# Patient Record
Sex: MALE | Employment: FULL TIME | URBAN - METROPOLITAN AREA
[De-identification: names, ages, dates, MRNs, and addresses within clinical notes are randomized per-mention and may not be internally consistent; named-entity substitution may affect disease eponyms.]

---

## 2021-11-03 ENCOUNTER — OFFICE VISIT (OUTPATIENT)
Dept: FAMILY MEDICINE CLINIC | Age: 41
End: 2021-11-03
Payer: COMMERCIAL

## 2021-11-03 VITALS
OXYGEN SATURATION: 98 % | DIASTOLIC BLOOD PRESSURE: 88 MMHG | WEIGHT: 18 LBS | HEIGHT: 66 IN | TEMPERATURE: 98.4 F | SYSTOLIC BLOOD PRESSURE: 129 MMHG | HEART RATE: 100 BPM | BODY MASS INDEX: 2.89 KG/M2 | RESPIRATION RATE: 20 BRPM

## 2021-11-03 DIAGNOSIS — Z11.59 ENCOUNTER FOR HEPATITIS C SCREENING TEST FOR LOW RISK PATIENT: ICD-10-CM

## 2021-11-03 DIAGNOSIS — E11.9 TYPE 2 DIABETES MELLITUS WITHOUT COMPLICATION, WITHOUT LONG-TERM CURRENT USE OF INSULIN (HCC): ICD-10-CM

## 2021-11-03 DIAGNOSIS — K21.9 GASTROESOPHAGEAL REFLUX DISEASE WITHOUT ESOPHAGITIS: ICD-10-CM

## 2021-11-03 DIAGNOSIS — I10 HYPERTENSION, UNSPECIFIED TYPE: ICD-10-CM

## 2021-11-03 DIAGNOSIS — Z09 HOSPITAL DISCHARGE FOLLOW-UP: ICD-10-CM

## 2021-11-03 DIAGNOSIS — Z12.5 SCREENING FOR MALIGNANT NEOPLASM OF PROSTATE: ICD-10-CM

## 2021-11-03 DIAGNOSIS — E78.2 MIXED HYPERLIPIDEMIA: ICD-10-CM

## 2021-11-03 DIAGNOSIS — K85.20 ALCOHOL-INDUCED ACUTE PANCREATITIS, UNSPECIFIED COMPLICATION STATUS: Primary | ICD-10-CM

## 2021-11-03 DIAGNOSIS — E78.1 HIGH BLOOD TRIGLYCERIDES: ICD-10-CM

## 2021-11-03 PROCEDURE — 99204 OFFICE O/P NEW MOD 45 MIN: CPT | Performed by: FAMILY MEDICINE

## 2021-11-03 RX ORDER — ATORVASTATIN CALCIUM 10 MG/1
10 TABLET, FILM COATED ORAL DAILY
COMMUNITY
Start: 2021-10-14 | End: 2021-11-03 | Stop reason: SDUPTHER

## 2021-11-03 RX ORDER — METOPROLOL TARTRATE 50 MG/1
50 TABLET ORAL EVERY 12 HOURS
Qty: 180 TABLET | Refills: 3 | Status: SHIPPED | OUTPATIENT
Start: 2021-11-03

## 2021-11-03 RX ORDER — LISINOPRIL 20 MG/1
20 TABLET ORAL DAILY
Qty: 90 TABLET | Refills: 3 | Status: SHIPPED | OUTPATIENT
Start: 2021-11-03

## 2021-11-03 RX ORDER — METFORMIN HYDROCHLORIDE 1000 MG/1
1000 TABLET ORAL 2 TIMES DAILY
COMMUNITY
Start: 2021-10-14 | End: 2021-11-03 | Stop reason: SDUPTHER

## 2021-11-03 RX ORDER — GLIMEPIRIDE 4 MG/1
4 TABLET ORAL DAILY
COMMUNITY
Start: 2021-10-12 | End: 2021-11-03 | Stop reason: SDUPTHER

## 2021-11-03 RX ORDER — GLIMEPIRIDE 4 MG/1
4 TABLET ORAL DAILY
Qty: 90 TABLET | Refills: 3 | Status: SHIPPED | OUTPATIENT
Start: 2021-11-03

## 2021-11-03 RX ORDER — ATORVASTATIN CALCIUM 10 MG/1
10 TABLET, FILM COATED ORAL DAILY
Qty: 90 TABLET | Refills: 3 | Status: SHIPPED | OUTPATIENT
Start: 2021-11-03 | End: 2022-09-15 | Stop reason: SDUPTHER

## 2021-11-03 RX ORDER — PANTOPRAZOLE SODIUM 40 MG/1
40 TABLET, DELAYED RELEASE ORAL DAILY
Qty: 90 TABLET | Refills: 3 | Status: SHIPPED | OUTPATIENT
Start: 2021-11-03

## 2021-11-03 RX ORDER — FENOFIBRATE 48 MG/1
TABLET, COATED ORAL
COMMUNITY
Start: 2021-10-27 | End: 2021-11-03 | Stop reason: SDUPTHER

## 2021-11-03 RX ORDER — LISINOPRIL 20 MG/1
20 TABLET ORAL DAILY
COMMUNITY
Start: 2021-10-12 | End: 2021-11-03 | Stop reason: SDUPTHER

## 2021-11-03 RX ORDER — PANTOPRAZOLE SODIUM 40 MG/1
40 TABLET, DELAYED RELEASE ORAL DAILY
COMMUNITY
Start: 2021-10-26 | End: 2021-11-03 | Stop reason: SDUPTHER

## 2021-11-03 RX ORDER — METFORMIN HYDROCHLORIDE 1000 MG/1
1000 TABLET ORAL 2 TIMES DAILY
Qty: 180 TABLET | Refills: 3 | Status: SHIPPED | OUTPATIENT
Start: 2021-11-03

## 2021-11-03 RX ORDER — METOPROLOL TARTRATE 50 MG/1
50 TABLET ORAL EVERY 12 HOURS
COMMUNITY
Start: 2021-10-27 | End: 2021-11-03 | Stop reason: SDUPTHER

## 2021-11-03 RX ORDER — FENOFIBRATE 48 MG/1
TABLET, COATED ORAL
Qty: 90 TABLET | Refills: 3 | Status: SHIPPED | OUTPATIENT
Start: 2021-11-03 | End: 2021-11-10 | Stop reason: ALTCHOICE

## 2021-11-03 NOTE — PROGRESS NOTES
Patient stated name &     Chief Complaint   Patient presents with    New Patient     GENET H/O follow up      Chest pains from drinking alcohol all week        Health Maintenance Due   Topic    Hepatitis C Screening     Lipid Screen     COVID-19 Vaccine (1)    DTaP/Tdap/Td series (1 - Tdap)    Flu Vaccine (1)       Wt Readings from Last 3 Encounters:   21 18 lb (8.165 kg)     Temp Readings from Last 3 Encounters:   21 98.4 °F (36.9 °C) (Temporal)     BP Readings from Last 3 Encounters:   21 129/88     Pulse Readings from Last 3 Encounters:   21 100         Learning Assessment:  :     No flowsheet data found. Depression Screening:  :     3 most recent PHQ Screens 11/3/2021   Little interest or pleasure in doing things Not at all   Feeling down, depressed, irritable, or hopeless Not at all   Total Score PHQ 2 0       Fall Risk Assessment:  :     Fall Risk Assessment, last 12 mths 11/3/2021   Able to walk? Yes   Fall in past 12 months? 0   Do you feel unsteady? 0   Are you worried about falling 0       Abuse Screening:  :     Abuse Screening Questionnaire 11/3/2021   Do you ever feel afraid of your partner? N   Are you in a relationship with someone who physically or mentally threatens you? N   Is it safe for you to go home? Y       Coordination of Care Questionnaire:  :     1) Have you been to an emergency room, urgent care clinic since your last visit? yes JW  Chest pains from drinking alcohol  Hospitalized since your last visit? no             2) Have you seen or consulted any other health care providers outside of [address] [place] [address] since your last visit? no  (Include any pap smears or colon screenings in this section.)    3) Do you have an Advance Directive on file?  no  Are you interested in receiving information about Advance Directives? no    Patient is accompanied by self I have received verbal consent from Larissa Castro to discuss any/all medical information while they are present in the room.

## 2021-11-03 NOTE — PROGRESS NOTES
11/3/2021   Marium Bennett (: 1980) is a 42 y.o. male, new patient, here for evaluation of the following chief complaint(s):  New Patient (Lex Cowden H/O follow up      Chest pains from drinking alcohol all week)     ASSESSMENT/PLAN:  Below is the assessment and plan developed based on review of pertinent history, physical exam, labs, studies, and medications. 1. Alcohol-induced acute pancreatitis, unspecified complication status  -     LIPASE; Future  2. Hospital discharge follow-up  -     LIPASE; Future  -     CBC WITH AUTOMATED DIFF; Future  -     METABOLIC PANEL, COMPREHENSIVE; Future  -     URINALYSIS W/ REFLEX CULTURE; Future  -     HEMOGLOBIN A1C WITH EAG; Future  -     MICROALBUMIN, UR, RAND W/ MICROALB/CREAT RATIO; Future  -     THYROID CASCADE PROFILE; Future  -     LIPID PANEL; Future  3. Mixed hyperlipidemia  -     fenofibrate nanocrystallized (TRICOR) 48 mg tablet; TAKE 1 TABLET BY MOUTH DAILY AT 5 PM, Normal, Disp-90 Tablet, R-3  -     METABOLIC PANEL, COMPREHENSIVE; Future  -     LIPID PANEL; Future  -     atorvastatin (LIPITOR) 10 mg tablet; Take 1 Tablet by mouth daily. , Normal, Disp-90 Tablet, R-3  4. Encounter for hepatitis C screening test for low risk patient  -     HEPATITIS C AB; Future  5. Type 2 diabetes mellitus without complication, without long-term current use of insulin (HCC)  -     CBC WITH AUTOMATED DIFF; Future  -     METABOLIC PANEL, COMPREHENSIVE; Future  -     URINALYSIS W/ REFLEX CULTURE; Future  -     HEMOGLOBIN A1C WITH EAG; Future  -     MICROALBUMIN, UR, RAND W/ MICROALB/CREAT RATIO; Future  -     glimepiride (AMARYL) 4 mg tablet; Take 1 Tablet by mouth daily. , Normal, Disp-90 Tablet, R-3  -     metFORMIN (GLUCOPHAGE) 1,000 mg tablet; Take 1 Tablet by mouth two (2) times a day., Normal, Disp-180 Tablet, R-3  -     REFERRAL TO OPHTHALMOLOGY  -     REFERRAL TO PODIATRY  6. Hypertension, unspecified type  -     METABOLIC PANEL, COMPREHENSIVE;  Future  -     URINALYSIS W/ REFLEX CULTURE; Future  -     THYROID CASCADE PROFILE; Future  -     lisinopriL (PRINIVIL, ZESTRIL) 20 mg tablet; Take 1 Tablet by mouth daily. , Normal, Disp-90 Tablet, R-3  -     metoprolol tartrate (LOPRESSOR) 50 mg tablet; Take 1 Tablet by mouth every twelve (12) hours. , Normal, Disp-180 Tablet, R-3  7. Screening for malignant neoplasm of prostate  -     PSA SCREENING (SCREENING); Future  8. High blood triglycerides  -     fenofibrate nanocrystallized (TRICOR) 48 mg tablet; TAKE 1 TABLET BY MOUTH DAILY AT 5 PM, Normal, Disp-90 Tablet, R-3  9. Gastroesophageal reflux disease without esophagitis  -     pantoprazole (PROTONIX) 40 mg tablet; Take 1 Tablet by mouth daily. , Normal, Disp-90 Tablet, R-3    Return in about 6 months (around 5/3/2022) for follow up. SUBJECTIVE/OBJECTIVE:  HPI   1. Alcohol-induced acute pancreatitis, unspecified complication status  2. Hospital discharge follow-up  Patient was recently admitted to [facility].  Date of admission: 9/29/2021, discharge: 10/2/2021. Admission diagnosis: Alcohol induced acute pancreatitis. I have reviewed discharge paperwork. I will check lipase levels. He denies any abdominal pain, nausea at this time. Requests refills of all medications. 3. Mixed hyperlipidemia  Currently taking Lipitor 10 mg daily without side effects. Requests refills    4. Encounter for hepatitis C screening test for low risk patient  Check labs    5. Type 2 diabetes mellitus without complication, without long-term current use of insulin (HCC)  Currently taking Metformin 1000 mg twice a day and glimepiride. Reports blood sugars are in 160s fasting. No side effects from current medications. Requests refills. 6. Hypertension, unspecified type  Currently taking lisinopril, metoprolol without any side effects. Requests refills. 7. Screening for malignant neoplasm of prostate  Check PSA    8. High blood triglycerides  Currently taking fenofibrate    9.  Gastroesophageal reflux disease without esophagitis  Currently taking pantoprazole. Requests refills. No results found for any visits on 11/03/21. Review of Systems   Constitutional: Negative. HENT: Negative. Eyes: Negative. Respiratory: Negative. Cardiovascular: Negative. Gastrointestinal: Negative. Genitourinary: Negative. Musculoskeletal: Negative. Skin: Negative. Neurological: Negative. Endo/Heme/Allergies: Negative. Psychiatric/Behavioral: Negative. Physical Exam  Constitutional:       Appearance: Normal appearance. HENT:      Right Ear: Tympanic membrane, ear canal and external ear normal.      Left Ear: Tympanic membrane, ear canal and external ear normal.   Eyes:      Extraocular Movements: Extraocular movements intact. Conjunctiva/sclera: Conjunctivae normal.      Pupils: Pupils are equal, round, and reactive to light. Neck:      Thyroid: No thyromegaly. Cardiovascular:      Rate and Rhythm: Normal rate and regular rhythm. Pulmonary:      Effort: Pulmonary effort is normal.      Breath sounds: Normal breath sounds. Abdominal:      General: Bowel sounds are normal. There is no distension. Palpations: Abdomen is soft. Tenderness: There is no abdominal tenderness. Musculoskeletal:         General: Normal range of motion. Cervical back: Normal range of motion and neck supple. Right lower leg: No edema. Left lower leg: No edema. Lymphadenopathy:      Cervical: No cervical adenopathy. Skin:     General: Skin is warm and dry. Neurological:      General: No focal deficit present. Mental Status: He is alert and oriented to person, place, and time. Mental status is at baseline.    Psychiatric:         Mood and Affect: Mood normal.         Behavior: Behavior normal.       /88 (BP 1 Location: Left upper arm, BP Patient Position: Sitting, BP Cuff Size: Adult)   Pulse 100   Temp 98.4 °F (36.9 °C) (Temporal)   Resp 20   Ht 5' 6\" (1.676 m)   Wt 18 lb (8.165 kg)   SpO2 98%   BMI 2.91 kg/m²     No Known Allergies    Current Outpatient Medications   Medication Sig    fenofibrate nanocrystallized (TRICOR) 48 mg tablet TAKE 1 TABLET BY MOUTH DAILY AT 5 PM    glimepiride (AMARYL) 4 mg tablet Take 1 Tablet by mouth daily.  lisinopriL (PRINIVIL, ZESTRIL) 20 mg tablet Take 1 Tablet by mouth daily.  metFORMIN (GLUCOPHAGE) 1,000 mg tablet Take 1 Tablet by mouth two (2) times a day.  metoprolol tartrate (LOPRESSOR) 50 mg tablet Take 1 Tablet by mouth every twelve (12) hours.  pantoprazole (PROTONIX) 40 mg tablet Take 1 Tablet by mouth daily.  atorvastatin (LIPITOR) 10 mg tablet Take 1 Tablet by mouth daily. No current facility-administered medications for this visit. Past Medical History:   Diagnosis Date    Diabetes ([address]     Hypertension     Pancreatitis        History reviewed. No pertinent surgical history. Social History:  reports that he has never smoked. He has never used smokeless tobacco. He reports current alcohol use. He reports that he does not use drugs. Patient Care Team:  Prasanna Rueda MD as PCP - General (Family Medicine)  Prasanna Rueda MD as PCP - [facility]    Problem List  Date Reviewed: 11/3/2021    None               I ADVISED PATIENT TO GO TO ER IF SYMPTOMS WORSEN , CHANGE OR FAILS TO IMPROVE. I have discussed the diagnosis with the patient and the intended plan as seen in the above orders. The patient has received an after-visit summary and questions were answered concerning future plans. I have discussed medication side effects and warnings with the patient as well. The patient agrees and understands above plan. An electronic signature was used to authenticate this note.   -- Louann Dean MD

## 2021-11-10 ENCOUNTER — TELEPHONE (OUTPATIENT)
Dept: FAMILY MEDICINE CLINIC | Age: 41
End: 2021-11-10

## 2021-11-10 DIAGNOSIS — E78.1 HIGH BLOOD TRIGLYCERIDES: Primary | ICD-10-CM

## 2021-11-10 RX ORDER — FENOFIBRATE 54 MG/1
54 TABLET ORAL DAILY
Qty: 90 TABLET | Refills: 3 | Status: SHIPPED | OUTPATIENT
Start: 2021-11-10

## 2021-11-11 ENCOUNTER — TELEPHONE (OUTPATIENT)
Dept: FAMILY MEDICINE CLINIC | Age: 41
End: 2021-11-11

## 2021-11-16 ENCOUNTER — TELEPHONE (OUTPATIENT)
Dept: FAMILY MEDICINE CLINIC | Age: 41
End: 2021-11-16

## 2021-11-16 NOTE — TELEPHONE ENCOUNTER
Hi Dr. Matthew Burgos,  [place] [id number] had requested records for Mr. Jerome form [facility].  I looked on the [address] [place] [address] website and didn't see his name, which means he doesn't exist in their system.   markus

## 2021-11-29 ENCOUNTER — VIRTUAL VISIT (OUTPATIENT)
Dept: FAMILY MEDICINE CLINIC | Age: 41
End: 2021-11-29
Payer: COMMERCIAL

## 2021-11-29 DIAGNOSIS — E11.9 TYPE 2 DIABETES MELLITUS WITHOUT COMPLICATION, WITHOUT LONG-TERM CURRENT USE OF INSULIN (HCC): ICD-10-CM

## 2021-11-29 DIAGNOSIS — Z71.2 ENCOUNTER TO DISCUSS TEST RESULTS: ICD-10-CM

## 2021-11-29 DIAGNOSIS — R74.8 ELEVATED LIVER ENZYMES: ICD-10-CM

## 2021-11-29 DIAGNOSIS — E78.2 MIXED HYPERLIPIDEMIA: ICD-10-CM

## 2021-11-29 DIAGNOSIS — I10 ESSENTIAL HYPERTENSION: Primary | ICD-10-CM

## 2021-11-29 PROCEDURE — 99214 OFFICE O/P EST MOD 30 MIN: CPT | Performed by: FAMILY MEDICINE

## 2021-11-29 NOTE — PROGRESS NOTES
1. Have you been to the ER, urgent care clinic since your last visit? Hospitalized since your last visit? No  2. Have you seen or consulted any other health care providers outside of the [address] [place] [address] since your last visit? Include any pap smears or colon screening. No    Chief Complaint   Patient presents with    Hypertension    Follow-up    Diabetes    Medication Refill     Health Maintenance Due   Topic Date Due    COVID-19 Vaccine (1) Never done    Foot Exam Q1  Never done    Eye Exam Retinal or Dilated  Never done    DTaP/Tdap/Td series (1 - Tdap) Never done    Flu Vaccine (1) Never done     3 most recent PHQ Screens 11/29/2021   Little interest or pleasure in doing things Not at all   Feeling down, depressed, irritable, or hopeless Not at all   Total Score PHQ 2 0     Abuse Screening Questionnaire 11/29/2021   Do you ever feel afraid of your partner? N   Are you in a relationship with someone who physically or mentally threatens you? N   Is it safe for you to go home?  Y     Patient-Reported Vitals 11/29/2021   Patient-Reported Weight 180lb

## 2021-11-29 NOTE — PROGRESS NOTES
Consent: Storm Pedraza, who was seen by synchronous (real-time) audio-video technology, and/or his healthcare decision maker, is aware that this patient-initiated, Telehealth encounter on 11/29/2021 is a billable service, with coverage as determined by his insurance carrier. He is aware that he may receive a bill and has provided verbal consent to proceed: Yes. Assessment & Plan:   Diagnoses and all orders for this visit:    1. Essential hypertension  -     METABOLIC PANEL, COMPREHENSIVE; Future    2. Type 2 diabetes mellitus without complication, without long-term current use of insulin (HCC)  -     METABOLIC PANEL, COMPREHENSIVE; Future  -     HEMOGLOBIN A1C WITH EAG; Future    3. Mixed hyperlipidemia  -     METABOLIC PANEL, COMPREHENSIVE; Future    4. Elevated liver enzymes  -     GGT; Future  -     HEPATITIS PANEL, ACUTE; Future  -     METABOLIC PANEL, COMPREHENSIVE; Future    5. Encounter to discuss test results          Follow-up and Dispositions    · Return in about 3 months (around 2/28/2022) for follow up. I ADVISED PATIENT TO GO TO ER IF SYMPTOMS WORSEN , CHANGE OR FAILS TO IMPROVE. I spent at least 15 minutes with this established patient, and >50% of the time was spent counseling and/or coordinating care regarding SEE BELOW  [phone]  Subjective:   Storm Pedraza is a 42 y.o. 1980 male  established patient, who was seen for Hypertension, Follow-up, Diabetes, and Medication Refill          1. Essential hypertension  The patient presents today for HTN follow-up. Taking medications daily w/o complications. Home BP readings range from does not check. SIde effects of meds: None  Patient trying to follow low salt diet.     Lab Results   Component Value Date/Time    Sodium 138 11/03/2021 02:09 PM    Potassium 4.5 11/03/2021 02:09 PM    Chloride 102 11/03/2021 02:09 PM    CO2 30 11/03/2021 02:09 PM    Anion gap 6 11/03/2021 02:09 PM    Glucose 199 (H) 11/03/2021 02:09 PM    BUN 12 11/03/2021 02:09 PM    Creatinine 1.26 11/03/2021 02:09 PM    BUN/Creatinine ratio 10 (L) 11/03/2021 02:09 PM    GFR est AA >60 11/03/2021 02:09 PM    GFR est non-AA >60 11/03/2021 02:09 PM    Calcium 10.2 (H) 11/03/2021 02:09 PM     Lab Results   Component Value Date/Time    Microalbumin/Creat ratio (mg/g creat) 26 11/03/2021 02:09 PM    Microalbumin,urine random 6.49 11/03/2021 02:09 PM       2. Type 2 diabetes mellitus without complication, without long-term current use of insulin ([facility] [address]    Patient declined adjusting diabetes medication today. He reports when the labs were drawn his HbA1c came back high because he was drinking too much alcohol. Ever since then patient has cut back on alcohol intake. I have advised him to keep blood sugar log and follow-up if blood sugars continue to be elevated. I have placed orders for repeat labs in 3 months. If it continues to be elevated I will adjust diabetes medications. Patient presents today for diabetes follow up. Pt. current diabetic medications include Metformin and glimepiride. Taking medication as prescribed. Current monitoring regimen: home blood tests - rarely. Home blood sugar records: fasting range: Does not check. Any episodes of hypoglycemia? no. Known diabetic complications: none. Cardiovascular risk factors: dyslipidemia. Diabetic Foot and Eye Exam HM Status   Topic Date Due    Diabetic Foot Care  Never done    Eye Exam  Never done       There is no immunization history on file for this patient. Lab Results   Component Value Date/Time    Microalbumin/Creat ratio (mg/g creat) 26 11/03/2021 02:09 PM    Microalbumin,urine random 6.49 11/03/2021 02:09 PM     Lab Results   Component Value Date/Time    Hemoglobin A1c 9.5 (H) 11/03/2021 02:09 PM         3. Mixed hyperlipidemia    Patient presents today for hyperlipidemia. Patient currently taking atorvastatin and fenofibrate. Taking medication as prescribed without side effects.   Trying to follow a low cholesterol diet. Exercising none  Skips doses of meds: None    Lab Results   Component Value Date/Time    Cholesterol, total 193 11/03/2021 02:09 PM    HDL Cholesterol 46 11/03/2021 02:09 PM    LDL, calculated 121 (H) 11/03/2021 02:09 PM    VLDL, calculated 26 11/03/2021 02:09 PM    Triglyceride 130 11/03/2021 02:09 PM    CHOL/HDL Ratio 4.2 11/03/2021 02:09 PM       4. Elevated liver enzymes  Liver enzymes elevated. Will evaluate further with labs. If repeat labs continue to be elevated will refer to hepatology. Advised patient to cut back on alcohol intake. AST: 40    5. Encounter to discuss test results  Discussed all lab results in detail with the patient       Prior to Admission medications    Medication Sig Start Date End Date Taking? Authorizing Provider   fenofibrate (LOFIBRA) 54 mg tablet Take 1 Tablet by mouth daily. 11/10/21  Yes Bg Cerna MD   glimepiride (AMARYL) 4 mg tablet Take 1 Tablet by mouth daily. 11/3/21  Yes Bg Cerna MD   lisinopriL (PRINIVIL, ZESTRIL) 20 mg tablet Take 1 Tablet by mouth daily. 11/3/21  Yes Bg Cerna MD   metFORMIN (GLUCOPHAGE) 1,000 mg tablet Take 1 Tablet by mouth two (2) times a day. 11/3/21  Yes Bg Cerna MD   metoprolol tartrate (LOPRESSOR) 50 mg tablet Take 1 Tablet by mouth every twelve (12) hours. 11/3/21  Yes Bg Cerna MD   pantoprazole (PROTONIX) 40 mg tablet Take 1 Tablet by mouth daily. 11/3/21  Yes Bg Cerna MD   atorvastatin (LIPITOR) 10 mg tablet Take 1 Tablet by mouth daily. 11/3/21  Yes Bg Cerna MD     No Known Allergies    There is no problem list on file for this patient. There are no problems to display for this patient. Current Outpatient Medications   Medication Sig Dispense Refill    fenofibrate (LOFIBRA) 54 mg tablet Take 1 Tablet by mouth daily. 90 Tablet 3    glimepiride (AMARYL) 4 mg tablet Take 1 Tablet by mouth daily. 90 Tablet 3    lisinopriL (PRINIVIL, ZESTRIL) 20 mg tablet Take 1 Tablet by mouth daily. 90 Tablet 3    metFORMIN (GLUCOPHAGE) 1,000 mg tablet Take 1 Tablet by mouth two (2) times a day. 180 Tablet 3    metoprolol tartrate (LOPRESSOR) 50 mg tablet Take 1 Tablet by mouth every twelve (12) hours. 180 Tablet 3    pantoprazole (PROTONIX) 40 mg tablet Take 1 Tablet by mouth daily. 90 Tablet 3    atorvastatin (LIPITOR) 10 mg tablet Take 1 Tablet by mouth daily. 90 Tablet 3     No Known Allergies  Past Medical History:   Diagnosis Date    Diabetes ([facility]     Hypertension     Pancreatitis      History reviewed. No pertinent surgical history. History reviewed. No pertinent family history. Social History     Tobacco Use    Smoking status: Never Smoker    Smokeless tobacco: Never Used   Substance Use Topics    Alcohol use: Not Currently       Review of Systems   Constitutional: Negative. HENT: Negative. Eyes: Negative. Respiratory: Negative. Cardiovascular: Negative. Gastrointestinal: Negative. Genitourinary: Negative. Musculoskeletal: Negative. Skin: Negative. Neurological: Negative. Endo/Heme/Allergies: Negative. Psychiatric/Behavioral: Negative.             Objective:     Patient-Reported Vitals 11/29/2021   Patient-Reported Weight 180lb        [INSTRUCTIONS:  \"[x]\" Indicates a positive item  \"[]\" Indicates a negative item  -- DELETE ALL ITEMS NOT EXAMINED]    Constitutional: [x] Appears well-developed and well-nourished [x] No apparent distress      [] Abnormal -     Mental status: [x] Alert and awake  [x] Oriented to person/place/time [x] Able to follow commands    [] Abnormal -     Eyes:   EOM    [x]  Normal    [] Abnormal -   Sclera  [x]  Normal    [] Abnormal -          Discharge [x]  None visible   [] Abnormal -     HENT: [x] Normocephalic, atraumatic  [] Abnormal -   [x] Mouth/Throat: Mucous membranes are moist    External Ears [x] Normal  [] Abnormal -    Neck: [x] No visualized mass [] Abnormal -     Pulmonary/Chest: [x] Respiratory effort normal   [x] No visualized signs of difficulty breathing or respiratory distress        [] Abnormal -      Musculoskeletal:   [x] Normal gait with no signs of ataxia         [x] Normal range of motion of neck        [] Abnormal -     Neurological:        [x] No Facial Asymmetry (Cranial nerve 7 motor function) (limited exam due to video visit)          [x] No gaze palsy        [] Abnormal -          Skin:        [x] No significant exanthematous lesions or discoloration noted on facial skin         [] Abnormal -            Psychiatric:       [x] Normal Affect [] Abnormal -        [x] No Hallucinations    Other pertinent observable physical exam findings:-    We discussed the expected course, resolution and complications of the diagnosis(es) in detail. Medication risks, benefits, costs, interactions, and alternatives were discussed as indicated. I advised him to contact the office if his condition worsens, changes or fails to improve as anticipated. He expressed understanding with the diagnosis(es) and plan. Jani Norman is a 42 y.o. male  is being evaluated by a Virtual Visit (video visit) encounter to address concerns as mentioned above. A caregiver was present when appropriate. Due to this being a TeleHealth encounter (During [facility] public health emergency), evaluation of the following organ systems was limited: Vitals/Constitutional/EENT/Resp/CV/GI//MS/Neuro/Skin/Heme-Lymph-Imm. Pursuant to the emergency declaration under the [address] [place] [address] authority and the [employer] and [employer] Act, this Virtual Visit was conducted with patient's (and/or legal guardian's) consent, to reduce the patient's risk of exposure to COVID-19 and provide necessary medical care.   The patient (and/or legal guardian) has also been advised to contact this office for worsening conditions or problems, and seek emergency medical treatment and/or call 911 if deemed necessary. Patient identification was verified at the start of the visit: YES    Services were provided through a video synchronous discussion virtually to substitute for in-person clinic visit. Patient and provider were located at their individual homes. An electronic signature was used to authenticate this note.       Ned Bennett MD

## 2022-07-15 ENCOUNTER — PATIENT MESSAGE (OUTPATIENT)
Dept: FAMILY MEDICINE CLINIC | Age: 42
End: 2022-07-15

## 2022-08-09 ENCOUNTER — VIRTUAL VISIT (OUTPATIENT)
Dept: FAMILY MEDICINE CLINIC | Age: 42
End: 2022-08-09
Payer: COMMERCIAL

## 2022-08-09 DIAGNOSIS — E78.2 MIXED HYPERLIPIDEMIA: ICD-10-CM

## 2022-08-09 DIAGNOSIS — R36.0 PENILE DISCHARGE, WITHOUT BLOOD: ICD-10-CM

## 2022-08-09 DIAGNOSIS — Z20.2 EXPOSURE TO STD: Primary | ICD-10-CM

## 2022-08-09 DIAGNOSIS — I10 ESSENTIAL HYPERTENSION: ICD-10-CM

## 2022-08-09 DIAGNOSIS — Z12.5 SCREENING PSA (PROSTATE SPECIFIC ANTIGEN): ICD-10-CM

## 2022-08-09 DIAGNOSIS — E11.9 TYPE 2 DIABETES MELLITUS WITHOUT COMPLICATION, WITHOUT LONG-TERM CURRENT USE OF INSULIN (HCC): ICD-10-CM

## 2022-08-09 PROCEDURE — 99214 OFFICE O/P EST MOD 30 MIN: CPT | Performed by: FAMILY MEDICINE

## 2022-08-09 RX ORDER — AZITHROMYCIN 500 MG/1
1000 TABLET, FILM COATED ORAL
Qty: 2 TABLET | Refills: 0 | Status: SHIPPED | OUTPATIENT
Start: 2022-08-09 | End: 2022-08-09

## 2022-08-09 NOTE — PROGRESS NOTES
Patient stated name &   Chief Complaint   Patient presents with    Diabetes     Follow up    Penile Discharge     Started about a month     No blood               Health Maintenance Due   Topic    COVID-19 Vaccine (1)    Pneumococcal 0-64 years (1 - PCV)    Foot Exam Q1     Eye Exam Retinal or Dilated     DTaP/Tdap/Td series (1 - Tdap)    A1C test (Diabetic or Prediabetic)        Wt Readings from Last 3 Encounters:   21 18 lb (8.165 kg)     Temp Readings from Last 3 Encounters:   21 98.4 °F (36.9 °C) (Temporal)     BP Readings from Last 3 Encounters:   21 129/88     Pulse Readings from Last 3 Encounters:   21 100         Learning Assessment:  :     No flowsheet data found. Depression Screening:  :     3 most recent PHQ Screens 2021   Little interest or pleasure in doing things Not at all   Feeling down, depressed, irritable, or hopeless Not at all   Total Score PHQ 2 0       Fall Risk Assessment:  :     Fall Risk Assessment, last 12 mths 11/3/2021   Able to walk? Yes   Fall in past 12 months? 0   Do you feel unsteady? 0   Are you worried about falling 0       Abuse Screening:  :     Abuse Screening Questionnaire 2021 11/3/2021   Do you ever feel afraid of your partner? N N   Are you in a relationship with someone who physically or mentally threatens you? N N   Is it safe for you to go home? Y Y       Coordination of Care Questionnaire:  :     1) Have you been to an emergency room, urgent care clinic since your last visit? no   Hospitalized since your last visit? no             2) Have you seen or consulted any other health care providers outside of [address] [place] [address] since your last visit? no  (Include any pap smears or colon screenings in this section.)    3) Do you have an Advance Directive on file?  no  Are you interested in receiving information about Advance Directives? no    Patient is accompanied by self I have received verbal consent from Rosario Parrish to discuss any/all medical information while they are present in the room.

## 2022-08-09 NOTE — PROGRESS NOTES
Consent: Mykel Delaney, who was seen by synchronous (real-time) audio-video technology, and/or his healthcare decision maker, is aware that this patient-initiated, Telehealth encounter on 8/9/2022 is a billable service, with coverage as determined by his insurance carrier. He is aware that he may receive a bill and has provided verbal consent to proceed: Yes. Assessment & Plan:   Diagnoses and all orders for this visit:    1. Exposure to STD  -     azithromycin (ZITHROMAX) 500 mg tab; Take 2 Tablets by mouth now for 1 dose. -     HIV 1/2 AG/AB, 4TH GENERATION,W RFLX CONFIRM; Future  -     HEPATITIS PANEL, ACUTE; Future  -     T PALLIDUM SCREEN W/REFLEX; Future  -     CT/NG/T.VAGINALIS AMPLIFICATION; Future  -     HSV 1 AND 2-SPEC AB, IGG W/RFX TO SUPPL HSV-2 TESTING; Future    2. Penile discharge, without blood  -     azithromycin (ZITHROMAX) 500 mg tab; Take 2 Tablets by mouth now for 1 dose. -     URINALYSIS W/ REFLEX CULTURE; Future  -     CT/NG/T.VAGINALIS AMPLIFICATION; Future    3. Essential hypertension  -     METABOLIC PANEL, COMPREHENSIVE; Future  -     THYROID CASCADE PROFILE; Future    4. Type 2 diabetes mellitus without complication, without long-term current use of insulin (HCC)  -     CBC WITH AUTOMATED DIFF; Future  -     METABOLIC PANEL, COMPREHENSIVE; Future  -     URINALYSIS W/ REFLEX CULTURE; Future  -     HEMOGLOBIN A1C WITH EAG; Future  -     MICROALBUMIN, UR, RAND W/ MICROALB/CREAT RATIO; Future    5. Mixed hyperlipidemia  -     METABOLIC PANEL, COMPREHENSIVE; Future  -     LIPID PANEL; Future    6. Screening PSA (prostate specific antigen)  -     PSA SCREENING (SCREENING); Future          Follow-up and Dispositions    Return in about 2 weeks (around 8/23/2022) for discuss labs, follow up, DIABETES, BP. I ADVISED PATIENT TO GO TO ER IF SYMPTOMS WORSEN , CHANGE OR FAILS TO IMPROVE.     I spent at least 15 minutes with this established patient, and >50% of the time was spent counseling and/or coordinating care regarding SEE BELOW  [id number]  Subjective:   Pedro Pablo French is a 36 y.o. 1980 male  established patient, who was seen for Diabetes (Follow up) and Penile Discharge (Started about a month     No blood     /)          1. Exposure to STD  Patient reports being exposed to STDs. He is currently requesting empiric treatment. I will check labs. 2. Penile discharge, without blood  Reports penile discharge. Requests treatment. Denies redness around the urethra. Denies fever chills or UTI symptoms. I will treat empirically with azithromycin. 3. Essential hypertension  Check labs    4. Type 2 diabetes mellitus without complication, without long-term current use of insulin (HCC)  Check labs    5. Mixed hyperlipidemia  Check cholesterol    6. Screening PSA (prostate specific antigen)  Check PSA       Prior to Admission medications    Medication Sig Start Date End Date Taking? Authorizing Provider   azithromycin (ZITHROMAX) 500 mg tab Take 2 Tablets by mouth now for 1 dose. 8/9/22 8/9/22 Yes Yanet Crane MD   fenofibrate (LOFIBRA) 54 mg tablet Take 1 Tablet by mouth daily. 11/10/21  Yes Yanet Crane MD   glimepiride (AMARYL) 4 mg tablet Take 1 Tablet by mouth daily. 11/3/21  Yes Yanet Crane MD   lisinopriL (PRINIVIL, ZESTRIL) 20 mg tablet Take 1 Tablet by mouth daily. 11/3/21  Yes Yanet Crane MD   metFORMIN (GLUCOPHAGE) 1,000 mg tablet Take 1 Tablet by mouth two (2) times a day. 11/3/21  Yes Yanet Crane MD   metoprolol tartrate (LOPRESSOR) 50 mg tablet Take 1 Tablet by mouth every twelve (12) hours. 11/3/21  Yes Yanet Crane MD   pantoprazole (PROTONIX) 40 mg tablet Take 1 Tablet by mouth daily. 11/3/21  Yes Yanet Crane MD   atorvastatin (LIPITOR) 10 mg tablet Take 1 Tablet by mouth daily. 11/3/21  Yes Yanet Crane MD     No Known Allergies    There is no problem list on file for this patient. There are no problems to display for this patient.    Current Outpatient Medications   Medication Sig Dispense Refill    azithromycin (ZITHROMAX) 500 mg tab Take 2 Tablets by mouth now for 1 dose. 2 Tablet 0    fenofibrate (LOFIBRA) 54 mg tablet Take 1 Tablet by mouth daily. 90 Tablet 3    glimepiride (AMARYL) 4 mg tablet Take 1 Tablet by mouth daily. 90 Tablet 3    lisinopriL (PRINIVIL, ZESTRIL) 20 mg tablet Take 1 Tablet by mouth daily. 90 Tablet 3    metFORMIN (GLUCOPHAGE) 1,000 mg tablet Take 1 Tablet by mouth two (2) times a day. 180 Tablet 3    metoprolol tartrate (LOPRESSOR) 50 mg tablet Take 1 Tablet by mouth every twelve (12) hours. 180 Tablet 3    pantoprazole (PROTONIX) 40 mg tablet Take 1 Tablet by mouth daily. 90 Tablet 3    atorvastatin (LIPITOR) 10 mg tablet Take 1 Tablet by mouth daily. 90 Tablet 3     No Known Allergies  Past Medical History:   Diagnosis Date    Diabetes ([address]     Hypertension     Pancreatitis      History reviewed. No pertinent surgical history. History reviewed. No pertinent family history. Social History     Tobacco Use    Smoking status: Never    Smokeless tobacco: Never   Substance Use Topics    Alcohol use: Not Currently       Review of Systems   Constitutional: Negative. HENT: Negative. Eyes: Negative. Respiratory: Negative. Cardiovascular: Negative. Gastrointestinal: Negative. Genitourinary: Negative. Musculoskeletal: Negative. Skin: Negative. Neurological: Negative. Endo/Heme/Allergies: Negative. Psychiatric/Behavioral: Negative.            Objective:     Patient-Reported Vitals 11/29/2021   Patient-Reported Weight 180lb        [INSTRUCTIONS:  \"[x]\" Indicates a positive item  \"[]\" Indicates a negative item  -- DELETE ALL ITEMS NOT EXAMINED]    Constitutional: [x] Appears well-developed and well-nourished [x] No apparent distress      [] Abnormal -     Mental status: [x] Alert and awake  [x] Oriented to person/place/time [x] Able to follow commands    [] Abnormal -     Eyes:   EOM    [x]  Normal [] Abnormal -   Sclera  [x]  Normal    [] Abnormal -          Discharge [x]  None visible   [] Abnormal -     HENT: [x] Normocephalic, atraumatic  [] Abnormal -   [x] Mouth/Throat: Mucous membranes are moist    External Ears [x] Normal  [] Abnormal -    Neck: [x] No visualized mass [] Abnormal -     Pulmonary/Chest: [x] Respiratory effort normal   [x] No visualized signs of difficulty breathing or respiratory distress        [] Abnormal -      Musculoskeletal:   [x] Normal gait with no signs of ataxia         [x] Normal range of motion of neck        [] Abnormal -     Neurological:        [x] No Facial Asymmetry (Cranial nerve 7 motor function) (limited exam due to video visit)          [x] No gaze palsy        [] Abnormal -          Skin:        [x] No significant exanthematous lesions or discoloration noted on facial skin         [] Abnormal -            Psychiatric:       [x] Normal Affect [] Abnormal -        [x] No Hallucinations    Other pertinent observable physical exam findings:-    We discussed the expected course, resolution and complications of the diagnosis(es) in detail. Medication risks, benefits, costs, interactions, and alternatives were discussed as indicated. I advised him to contact the office if his condition worsens, changes or fails to improve as anticipated. He expressed understanding with the diagnosis(es) and plan. On this date 08/09/2022 I have spent 25-30 minutes reviewing previous notes, test results and face to face with the patient discussing the diagnosis and importance of compliance with the treatment plan as well as documenting on the day of the visit. Marium Bennett is a 36 y.o. male  is being evaluated by a Virtual Visit (video visit) encounter to address concerns as mentioned above. A caregiver was present when appropriate.  Due to this being a TeleHealth encounter (During SBRZD-42 public health emergency), evaluation of the following organ systems was limited: Vitals/Constitutional/EENT/Resp/CV/GI//MS/Neuro/Skin/Heme-Lymph-Imm. Pursuant to the emergency declaration under the [address] [place] [address] and the [employer] Act, this Virtual Visit was conducted with patient's (and/or legal guardian's) consent, to reduce the patient's risk of exposure to COVID-19 and provide necessary medical care. The patient (and/or legal guardian) has also been advised to contact this office for worsening conditions or problems, and seek emergency medical treatment and/or call 911 if deemed necessary. Patient identification was verified at the start of the visit: YES    Services were provided through a video synchronous discussion virtually to substitute for in-person clinic visit. Patient was located at their homes. Provider located in home    An electronic signature was used to authenticate this note.       Karime eReves MD

## 2022-09-06 LAB
ALBUMIN SERPL-MCNC: 5.1 G/DL (ref 4–5)
ALBUMIN/CREAT UR: 8 MG/G CREAT (ref 0–29)
ALBUMIN/GLOB SERPL: 2 {RATIO} (ref 1.2–2.2)
ALP SERPL-CCNC: 50 IU/L (ref 44–121)
ALT SERPL-CCNC: 74 IU/L (ref 0–44)
APPEARANCE UR: CLEAR
AST SERPL-CCNC: 74 IU/L (ref 0–40)
BACTERIA #/AREA URNS HPF: NORMAL /[HPF]
BASOPHILS # BLD AUTO: 0 X10E3/UL (ref 0–0.2)
BASOPHILS NFR BLD AUTO: 1 %
BILIRUB SERPL-MCNC: 0.5 MG/DL (ref 0–1.2)
BILIRUB UR QL STRIP: NEGATIVE
BUN SERPL-MCNC: 19 MG/DL (ref 6–24)
BUN/CREAT SERPL: 16 (ref 9–20)
C TRACH RRNA SPEC QL NAA+PROBE: NEGATIVE
CALCIUM SERPL-MCNC: 10.3 MG/DL (ref 8.7–10.2)
CASTS URNS QL MICRO: NORMAL /LPF
CHLORIDE SERPL-SCNC: 100 MMOL/L (ref 96–106)
CHOLEST SERPL-MCNC: 204 MG/DL (ref 100–199)
CO2 SERPL-SCNC: 23 MMOL/L (ref 20–29)
COLOR UR: YELLOW
CREAT SERPL-MCNC: 1.17 MG/DL (ref 0.76–1.27)
CREAT UR-MCNC: 112.9 MG/DL
EGFR: 79 ML/MIN/1.73
EOSINOPHIL # BLD AUTO: 0.1 X10E3/UL (ref 0–0.4)
EOSINOPHIL NFR BLD AUTO: 1 %
EPI CELLS #/AREA URNS HPF: NORMAL /HPF (ref 0–10)
ERYTHROCYTE [DISTWIDTH] IN BLOOD BY AUTOMATED COUNT: 12.6 % (ref 11.6–15.4)
EST. AVERAGE GLUCOSE BLD GHB EST-MCNC: 220 MG/DL
GLOBULIN SER CALC-MCNC: 2.6 G/DL (ref 1.5–4.5)
GLUCOSE SERPL-MCNC: 129 MG/DL (ref 65–99)
GLUCOSE UR QL STRIP: ABNORMAL
HAV IGM SERPL QL IA: NEGATIVE
HBA1C MFR BLD: 9.3 % (ref 4.8–5.6)
HBV CORE IGM SERPL QL IA: NEGATIVE
HBV SURFACE AG SERPL QL IA: NEGATIVE
HCT VFR BLD AUTO: 45.1 % (ref 37.5–51)
HCV AB S/CO SERPL IA: <0.1 S/CO RATIO (ref 0–0.9)
HCV AB SERPL QL IA: NORMAL
HDLC SERPL-MCNC: 56 MG/DL
HGB BLD-MCNC: 14.9 G/DL (ref 13–17.7)
HGB UR QL STRIP: NEGATIVE
HIV 1+2 AB+HIV1 P24 AG SERPL QL IA: NON REACTIVE
HSV1 IGG SER IA-ACNC: 47 INDEX (ref 0–0.9)
HSV2 IGG SER IA-ACNC: <0.91 INDEX (ref 0–0.9)
IMM GRANULOCYTES # BLD AUTO: 0 X10E3/UL (ref 0–0.1)
IMM GRANULOCYTES NFR BLD AUTO: 0 %
KETONES UR QL STRIP: NEGATIVE
LDLC SERPL CALC-MCNC: 128 MG/DL (ref 0–99)
LEUKOCYTE ESTERASE UR QL STRIP: NEGATIVE
LYMPHOCYTES # BLD AUTO: 3.4 X10E3/UL (ref 0.7–3.1)
LYMPHOCYTES NFR BLD AUTO: 51 %
MCH RBC QN AUTO: 30.6 PG (ref 26.6–33)
MCHC RBC AUTO-ENTMCNC: 33 G/DL (ref 31.5–35.7)
MCV RBC AUTO: 93 FL (ref 79–97)
MICRO URNS: ABNORMAL
MICRO URNS: ABNORMAL
MICROALBUMIN UR-MCNC: 9.4 UG/ML
MONOCYTES # BLD AUTO: 0.4 X10E3/UL (ref 0.1–0.9)
MONOCYTES NFR BLD AUTO: 5 %
N GONORRHOEA RRNA SPEC QL NAA+PROBE: NEGATIVE
NEUTROPHILS # BLD AUTO: 2.8 X10E3/UL (ref 1.4–7)
NEUTROPHILS NFR BLD AUTO: 42 %
NITRITE UR QL STRIP: NEGATIVE
PH UR STRIP: 5 [PH] (ref 5–7.5)
PLATELET # BLD AUTO: 289 X10E3/UL (ref 150–450)
POTASSIUM SERPL-SCNC: 4.5 MMOL/L (ref 3.5–5.2)
PROT SERPL-MCNC: 7.7 G/DL (ref 6–8.5)
PROT UR QL STRIP: NEGATIVE
PSA SERPL-MCNC: 0.6 NG/ML (ref 0–4)
RBC # BLD AUTO: 4.87 X10E6/UL (ref 4.14–5.8)
RBC #/AREA URNS HPF: NORMAL /HPF (ref 0–2)
SODIUM SERPL-SCNC: 142 MMOL/L (ref 134–144)
SP GR UR STRIP: 1.02 (ref 1–1.03)
T VAGINALIS RRNA SPEC QL NAA+PROBE: NEGATIVE
TREPONEMA PALLIDUM IGG+IGM AB [PRESENCE] IN SERUM OR PLASMA BY IMMUNOASSAY: NON REACTIVE
TRIGL SERPL-MCNC: 111 MG/DL (ref 0–149)
TSH SERPL DL<=0.005 MIU/L-ACNC: 1.91 UIU/ML (ref 0.45–4.5)
URINALYSIS REFLEX, 377202: ABNORMAL
UROBILINOGEN UR STRIP-MCNC: 0.2 MG/DL (ref 0.2–1)
VLDLC SERPL CALC-MCNC: 20 MG/DL (ref 5–40)
WBC # BLD AUTO: 6.7 X10E3/UL (ref 3.4–10.8)
WBC #/AREA URNS HPF: NORMAL /HPF (ref 0–5)

## 2022-09-12 ENCOUNTER — VIRTUAL VISIT (OUTPATIENT)
Dept: FAMILY MEDICINE CLINIC | Age: 42
End: 2022-09-12

## 2022-09-15 ENCOUNTER — VIRTUAL VISIT (OUTPATIENT)
Dept: FAMILY MEDICINE CLINIC | Age: 42
End: 2022-09-15
Payer: COMMERCIAL

## 2022-09-15 DIAGNOSIS — Z71.2 ENCOUNTER TO DISCUSS TEST RESULTS: ICD-10-CM

## 2022-09-15 DIAGNOSIS — F10.10 ALCOHOL ABUSE: ICD-10-CM

## 2022-09-15 DIAGNOSIS — E11.9 TYPE 2 DIABETES MELLITUS WITHOUT COMPLICATION, WITHOUT LONG-TERM CURRENT USE OF INSULIN (HCC): ICD-10-CM

## 2022-09-15 DIAGNOSIS — E55.9 VITAMIN D DEFICIENCY: ICD-10-CM

## 2022-09-15 DIAGNOSIS — B00.9 HSV-1 (HERPES SIMPLEX VIRUS 1) INFECTION: Primary | ICD-10-CM

## 2022-09-15 DIAGNOSIS — R36.9 PENILE DISCHARGE: ICD-10-CM

## 2022-09-15 DIAGNOSIS — E78.2 MIXED HYPERLIPIDEMIA: ICD-10-CM

## 2022-09-15 DIAGNOSIS — E53.8 B12 DEFICIENCY: ICD-10-CM

## 2022-09-15 DIAGNOSIS — R74.8 ELEVATED LIVER ENZYMES: ICD-10-CM

## 2022-09-15 DIAGNOSIS — E83.52 HYPERCALCEMIA: ICD-10-CM

## 2022-09-15 PROCEDURE — 3046F HEMOGLOBIN A1C LEVEL >9.0%: CPT | Performed by: FAMILY MEDICINE

## 2022-09-15 PROCEDURE — 99214 OFFICE O/P EST MOD 30 MIN: CPT | Performed by: FAMILY MEDICINE

## 2022-09-15 RX ORDER — LANCETS
EACH MISCELLANEOUS
Qty: 100 EACH | Refills: 11 | Status: SHIPPED | OUTPATIENT
Start: 2022-09-15

## 2022-09-15 RX ORDER — ATORVASTATIN CALCIUM 20 MG/1
20 TABLET, FILM COATED ORAL DAILY
Qty: 90 TABLET | Refills: 3 | Status: SHIPPED | OUTPATIENT
Start: 2022-09-15

## 2022-09-15 RX ORDER — INSULIN PUMP SYRINGE, 3 ML
EACH MISCELLANEOUS
Qty: 1 KIT | Refills: 0 | Status: SHIPPED | OUTPATIENT
Start: 2022-09-15

## 2022-09-15 RX ORDER — IBUPROFEN 200 MG
CAPSULE ORAL
Qty: 100 STRIP | Refills: 5 | Status: SHIPPED | OUTPATIENT
Start: 2022-09-15

## 2022-09-15 NOTE — PROGRESS NOTES
Consent: Shelley Harris, who was seen by synchronous (real-time) audio-video technology, and/or his healthcare decision maker, is aware that this patient-initiated, Telehealth encounter on 9/15/2022 is a billable service, with coverage as determined by his insurance carrier. He is aware that he may receive a bill and has provided verbal consent to proceed: Yes. Assessment & Plan:   Diagnoses and all orders for this visit:    1. HSV-1 (herpes simplex virus 1) infection    2. Type 2 diabetes mellitus without complication, without long-term current use of insulin ([facility])  -     REFERRAL TO DIABETIC EDUCATION  -     Blood-Glucose Meter monitoring kit; Check blood glucose daily  -     glucose blood VI test strips (blood glucose test) strip; Check blood glucose fasting daily  -     lancets misc; Check blood glucose daily  -     SITagliptin (JANUVIA) 50 mg tablet; Take 1 Tablet by mouth daily. 3. Encounter to discuss test results    4. Alcohol abuse  -     VITAMIN B12 & FOLATE; Future  -     US ABD COMP; Future  -     METABOLIC PANEL, COMPREHENSIVE; Future    5. Mixed hyperlipidemia  -     atorvastatin (LIPITOR) 20 mg tablet; Take 1 Tablet by mouth daily. 6. Hypercalcemia  -     CALCIUM, IONIZED; Future  -     PTH INTACT; Future  -     PTH-RELATED PEPTIDE; Future  -     VITAMIN D, 25 HYDROXY; Future    7. Vitamin D deficiency  -     VITAMIN D, 25 HYDROXY; Future    8. B12 deficiency  -     VITAMIN B12 & FOLATE; Future    9. Elevated liver enzymes  -     US ABD COMP; Future  -     METABOLIC PANEL, COMPREHENSIVE; Future    10. Penile discharge  -     REFERRAL TO UROLOGY        Follow-up and Dispositions    Return in about 2 weeks (around 9/29/2022) for discuss labs, DIABETES. I ADVISED PATIENT TO GO TO ER IF SYMPTOMS WORSEN , CHANGE OR FAILS TO IMPROVE.     I spent at least 15 minutes with this established patient, and >50% of the time was spent counseling and/or coordinating care regarding SEE BELOW  [id number]  Subjective:   Matthieu Vasquez is a 36 y.o. 1980 male  established patient, who was seen for Other (Lab results )          1. HSV-1 (herpes simplex virus 1) infection  Discussed lab findings of HSV 1 infection. Patient currently asymptomatic. No treatment. 2. Type 2 diabetes mellitus without complication, without long-term current use of insulin ([address]  Add Januvia. Patient presents today for diabetes follow up. Pt. current diabetic medications include metformin and glimepiride e prescribed. Taking medication as prescribed. Current monitoring regimen: none. Home blood sugar records: fasting range: Does not check. Any episodes of hypoglycemia? no. Known diabetic complications: none. Cardiovascular risk factors: dyslipidemia, diabetes mellitus. Diabetic Foot and Eye Exam HM Status   Topic Date Due    Diabetic Foot Care  Never done    Eye Exam  Never done       There is no immunization history on file for this patient. Lab Results   Component Value Date/Time    Microalbumin/Creat ratio (mg/g creat) 26 11/03/2021 02:09 PM    Microalb/Creat ratio (ug/mg creat.) 8 09/01/2022 12:00 AM    Microalbumin,urine random 6.49 11/03/2021 02:09 PM     Lab Results   Component Value Date/Time    Hemoglobin A1c 9.3 (H) 09/01/2022 12:00 AM          3. Encounter to discuss test results  All lab results discussed in detail with the patient today    4. Alcohol abuse  Patient reports he was drinking a lot of alcohol prior to getting labs drawn. He has cut back on alcohol intake. This could be causing elevated liver enzymes. 5. Mixed hyperlipidemia  Increase dose of Lipitor. Cholesterol above goal    6. Hypercalcemia  Calcium level elevated. I will check further labs to evaluate. 7. Vitamin D deficiency  Check vitamin D level    8. B12 deficiency  Check B12 level    9. Elevated liver enzymes  Most likely from alcohol intake. Check labs and ultrasound. 10. Penile discharge  STD testing negative.   Patient continues to have penile discharge. Will refer to urology. Prior to Admission medications    Medication Sig Start Date End Date Taking? Authorizing Provider   Blood-Glucose Meter monitoring kit Check blood glucose daily 9/15/22  Yes Callie French MD   glucose blood VI test strips (blood glucose test) strip Check blood glucose fasting daily 9/15/22  Yes Callie French MD   lancets misc Check blood glucose daily 9/15/22  Yes Callie French MD   SITagliptin (JANUVIA) 50 mg tablet Take 1 Tablet by mouth daily. 9/15/22  Yes Callie French MD   atorvastatin (LIPITOR) 20 mg tablet Take 1 Tablet by mouth daily. 9/15/22  Yes Callie French MD   fenofibrate (LOFIBRA) 54 mg tablet Take 1 Tablet by mouth daily. 11/10/21  Yes Callie French MD   glimepiride (AMARYL) 4 mg tablet Take 1 Tablet by mouth daily. 11/3/21  Yes Callie French MD   lisinopriL (PRINIVIL, ZESTRIL) 20 mg tablet Take 1 Tablet by mouth daily. 11/3/21  Yes Callie French MD   metFORMIN (GLUCOPHAGE) 1,000 mg tablet Take 1 Tablet by mouth two (2) times a day. 11/3/21  Yes Callie French MD   metoprolol tartrate (LOPRESSOR) 50 mg tablet Take 1 Tablet by mouth every twelve (12) hours. 11/3/21  Yes Callie French MD   pantoprazole (PROTONIX) 40 mg tablet Take 1 Tablet by mouth daily. 11/3/21  Yes Callie French MD   atorvastatin (LIPITOR) 10 mg tablet Take 1 Tablet by mouth daily.  11/3/21 9/15/22  Callie French MD     No Known Allergies    Patient Active Problem List   Diagnosis Code    Alcohol abuse F10.10    Mixed hyperlipidemia E78.2    Hypercalcemia E83.52    Elevated liver enzymes R74.8    Type 2 diabetes mellitus without complication, without long-term current use of insulin (HCC) E11.9    HSV-1 (herpes simplex virus 1) infection B00.9     Patient Active Problem List    Diagnosis Date Noted    Alcohol abuse 09/15/2022    Mixed hyperlipidemia 09/15/2022    Hypercalcemia 09/15/2022    Elevated liver enzymes 09/15/2022    Type 2 diabetes mellitus without complication, without long-term current use of insulin ([facility] 09/15/2022    HSV-1 (herpes simplex virus 1) infection 09/15/2022     Current Outpatient Medications   Medication Sig Dispense Refill    Blood-Glucose Meter monitoring kit Check blood glucose daily 1 Kit 0    glucose blood VI test strips (blood glucose test) strip Check blood glucose fasting daily 100 Strip 5    lancets misc Check blood glucose daily 100 Each 11    SITagliptin (JANUVIA) 50 mg tablet Take 1 Tablet by mouth daily. 90 Tablet 1    atorvastatin (LIPITOR) 20 mg tablet Take 1 Tablet by mouth daily. 90 Tablet 3    fenofibrate (LOFIBRA) 54 mg tablet Take 1 Tablet by mouth daily. 90 Tablet 3    glimepiride (AMARYL) 4 mg tablet Take 1 Tablet by mouth daily. 90 Tablet 3    lisinopriL (PRINIVIL, ZESTRIL) 20 mg tablet Take 1 Tablet by mouth daily. 90 Tablet 3    metFORMIN (GLUCOPHAGE) 1,000 mg tablet Take 1 Tablet by mouth two (2) times a day. 180 Tablet 3    metoprolol tartrate (LOPRESSOR) 50 mg tablet Take 1 Tablet by mouth every twelve (12) hours. 180 Tablet 3    pantoprazole (PROTONIX) 40 mg tablet Take 1 Tablet by mouth daily. 90 Tablet 3       Review of Systems   Constitutional: Negative. HENT: Negative. Eyes: Negative. Respiratory: Negative. Cardiovascular: Negative. Gastrointestinal: Negative. Genitourinary: Negative. Musculoskeletal: Negative. Skin: Negative. Neurological: Negative. Endo/Heme/Allergies: Negative. Psychiatric/Behavioral: Negative.            Objective:     Patient-Reported Vitals 9/15/2022   Patient-Reported Weight 180lbs        [INSTRUCTIONS:  \"[x]\" Indicates a positive item  \"[]\" Indicates a negative item  -- DELETE ALL ITEMS NOT EXAMINED]    Constitutional: [x] Appears well-developed and well-nourished [x] No apparent distress      [] Abnormal -     Mental status: [x] Alert and awake  [x] Oriented to person/place/time [x] Able to follow commands    [] Abnormal -     Eyes: EOM    [x]  Normal    [] Abnormal -   Sclera  [x]  Normal    [] Abnormal -          Discharge [x]  None visible   [] Abnormal -     HENT: [x] Normocephalic, atraumatic  [] Abnormal -   [x] Mouth/Throat: Mucous membranes are moist    External Ears [x] Normal  [] Abnormal -    Neck: [x] No visualized mass [] Abnormal -     Pulmonary/Chest: [x] Respiratory effort normal   [x] No visualized signs of difficulty breathing or respiratory distress        [] Abnormal -      Musculoskeletal:   [x] Normal gait with no signs of ataxia         [x] Normal range of motion of neck        [] Abnormal -     Neurological:        [x] No Facial Asymmetry (Cranial nerve 7 motor function) (limited exam due to video visit)          [x] No gaze palsy        [] Abnormal -          Skin:        [x] No significant exanthematous lesions or discoloration noted on facial skin         [] Abnormal -            Psychiatric:       [x] Normal Affect [] Abnormal -        [x] No Hallucinations    Other pertinent observable physical exam findings:-    We discussed the expected course, resolution and complications of the diagnosis(es) in detail. Medication risks, benefits, costs, interactions, and alternatives were discussed as indicated. I advised him to contact the office if his condition worsens, changes or fails to improve as anticipated. He expressed understanding with the diagnosis(es) and plan. Jani Norman is a 36 y.o. male  is being evaluated by a Virtual Visit (video visit) encounter to address concerns as mentioned above. A caregiver was present when appropriate. Due to this being a TeleHealth encounter (During [facility] public health [facility]), evaluation of the following organ systems was limited: Vitals/Constitutional/EENT/Resp/CV/GI//MS/Neuro/Skin/Heme-Lymph-Imm.   Pursuant to the emergency declaration under the [facility] waiver authority and the [employer] Act, this Virtual Visit was conducted with patient's (and/or legal guardian's) consent, to reduce the patient's risk of exposure to COVID-19 and provide necessary medical care. The patient (and/or legal guardian) has also been advised to contact this office for worsening conditions or problems, and seek emergency medical treatment and/or call 911 if deemed necessary. Patient identification was verified at the start of the visit: YES    Services were provided through a video synchronous discussion virtually to substitute for in-person clinic visit. Patient was located at their homes. Provider located in office    An electronic signature was used to authenticate this note.       Melisa Huffman MD

## 2022-09-15 NOTE — PROGRESS NOTES
1. Have you been to the ER, urgent care clinic since your last visit? Hospitalized since your last visit? No    2. Have you seen or consulted any other health care providers outside of the [address] [place] [address] since your last visit? Include any pap smears or colon screening.  No    Chief Complaint   Patient presents with    Other     Lab results

## 2022-12-22 ENCOUNTER — VIRTUAL VISIT (OUTPATIENT)
Dept: FAMILY MEDICINE CLINIC | Age: 42
End: 2022-12-22
Payer: COMMERCIAL

## 2022-12-22 DIAGNOSIS — I10 HYPERTENSION, UNSPECIFIED TYPE: ICD-10-CM

## 2022-12-22 DIAGNOSIS — E78.1 HIGH BLOOD TRIGLYCERIDES: ICD-10-CM

## 2022-12-22 DIAGNOSIS — E11.9 TYPE 2 DIABETES MELLITUS WITHOUT COMPLICATION, WITHOUT LONG-TERM CURRENT USE OF INSULIN (HCC): Primary | ICD-10-CM

## 2022-12-22 DIAGNOSIS — K21.9 GASTROESOPHAGEAL REFLUX DISEASE WITHOUT ESOPHAGITIS: ICD-10-CM

## 2022-12-22 PROCEDURE — 3046F HEMOGLOBIN A1C LEVEL >9.0%: CPT | Performed by: NURSE PRACTITIONER

## 2022-12-22 PROCEDURE — 99214 OFFICE O/P EST MOD 30 MIN: CPT | Performed by: NURSE PRACTITIONER

## 2022-12-22 RX ORDER — LISINOPRIL 20 MG/1
20 TABLET ORAL DAILY
Qty: 90 TABLET | Refills: 1 | Status: SHIPPED | OUTPATIENT
Start: 2022-12-22

## 2022-12-22 RX ORDER — PANTOPRAZOLE SODIUM 40 MG/1
40 TABLET, DELAYED RELEASE ORAL DAILY
Qty: 90 TABLET | Refills: 3 | Status: SHIPPED | OUTPATIENT
Start: 2022-12-22

## 2022-12-22 RX ORDER — METOPROLOL TARTRATE 50 MG/1
50 TABLET ORAL EVERY 12 HOURS
Qty: 180 TABLET | Refills: 1 | Status: SHIPPED | OUTPATIENT
Start: 2022-12-22

## 2022-12-22 RX ORDER — METFORMIN HYDROCHLORIDE 1000 MG/1
1000 TABLET ORAL 2 TIMES DAILY
Qty: 180 TABLET | Refills: 1 | Status: SHIPPED | OUTPATIENT
Start: 2022-12-22

## 2022-12-22 RX ORDER — FENOFIBRATE 54 MG/1
54 TABLET ORAL DAILY
Qty: 90 TABLET | Refills: 1 | Status: SHIPPED | OUTPATIENT
Start: 2022-12-22

## 2022-12-22 RX ORDER — GLIMEPIRIDE 4 MG/1
4 TABLET ORAL DAILY
Qty: 90 TABLET | Refills: 1 | Status: SHIPPED | OUTPATIENT
Start: 2022-12-22

## 2022-12-22 NOTE — PROGRESS NOTES
Ryley Godoy is a 36 y.o. male who was seen by synchronous (real-time) audio-video technology on 12/22/2022 for No chief complaint on file. Assessment & Plan:   Diagnoses and all orders for this visit:    1. Type 2 diabetes mellitus without complication, without long-term current use of insulin (HCC)  -     glimepiride (AMARYL) 4 mg tablet; Take 1 Tablet by mouth daily. -     metFORMIN (GLUCOPHAGE) 1,000 mg tablet; Take 1 Tablet by mouth two (2) times a day. -     SITagliptin phosphate (JANUVIA) 50 mg tablet; Take 1 Tablet by mouth daily.  -     HEMOGLOBIN A1C WITH EAG; Future  - Presumed improving, patient has decreased alcohol use, taking medications as prescribed however was unable to get Januvia as pharmacy would not fill it likely due to coverage. We will resend and start prior authorization process. Labs today    2. High blood triglycerides  -     fenofibrate (LOFIBRA) 54 mg tablet; Take 1 Tablet by mouth daily.  -     LIPID PANEL; Future  - M stable, refill today, labs today    3. Hypertension, unspecified type  -     lisinopriL (PRINIVIL, ZESTRIL) 20 mg tablet; Take 1 Tablet by mouth daily. -     metoprolol tartrate (LOPRESSOR) 50 mg tablet; Take 1 Tablet by mouth every twelve (12) hours. - Presumed stable, continue current therapy, refill today    4. Gastroesophageal reflux disease without esophagitis  -     pantoprazole (PROTONIX) 40 mg tablet; Take 1 Tablet by mouth daily.  - Stable, refill today, continue current therapy      I spent at least 10 minutes on this visit with this established patient.     Subjective:   VV today for diabetes follow up    Starting checking BG  Staying between 140-160  Has not done the diabetic education class  Januvia was not covered so he is not taking it  Last A1c was 9.3 down from 9.5    States he has cut down the alcohol use  Only drinks a couple beers on the weekend     Does not check blood pressure at home  Prior to Admission medications    Medication Sig Start Date End Date Taking? Authorizing Provider   fenofibrate (LOFIBRA) 54 mg tablet Take 1 Tablet by mouth daily. 12/22/22  Yes Baldo Lopez NP   glimepiride (AMARYL) 4 mg tablet Take 1 Tablet by mouth daily. 12/22/22  Yes Baldo Lopez NP   lisinopriL (PRINIVIL, ZESTRIL) 20 mg tablet Take 1 Tablet by mouth daily. 12/22/22  Yes Baldo Lopez NP   metFORMIN (GLUCOPHAGE) 1,000 mg tablet Take 1 Tablet by mouth two (2) times a day. 12/22/22  Yes Baldo Lopez NP   metoprolol tartrate (LOPRESSOR) 50 mg tablet Take 1 Tablet by mouth every twelve (12) hours. 12/22/22  Yes Baldo Lopez NP   pantoprazole (PROTONIX) 40 mg tablet Take 1 Tablet by mouth daily. 12/22/22  Yes Baldo Lopez NP   SITagliptin phosphate (JANUVIA) 50 mg tablet Take 1 Tablet by mouth daily. 12/22/22  Yes Baldo Lopez NP   Blood-Glucose Meter monitoring kit Check blood glucose daily 9/15/22  Yes Libertad Mortensen MD   glucose blood VI test strips (blood glucose test) strip Check blood glucose fasting daily 9/15/22  Yes Libertad Mortensen MD   [facility] 9/15/22  Yes Libertad Mortensen MD   atorvastatin (LIPITOR) 20 mg tablet Take 1 Tablet by mouth daily. 9/15/22  Yes Libertad Mortensen MD   SITagliptin (JANUVIA) 50 mg tablet Take 1 Tablet by mouth daily. Patient not taking: Reported on 12/22/2022 9/15/22 12/22/22  Libertad Mortensen MD   fenofibrate (LOFIBRA) 54 mg tablet Take 1 Tablet by mouth daily. 11/10/21 12/22/22  Libertad Mortensen MD   glimepiride (AMARYL) 4 mg tablet Take 1 Tablet by mouth daily. 11/3/21 12/22/22  Libertad Mortensen MD   lisinopriL (PRINIVIL, ZESTRIL) 20 mg tablet Take 1 Tablet by mouth daily. 11/3/21 12/22/22  Libertad Mortensen MD   metFORMIN (GLUCOPHAGE) 1,000 mg tablet Take 1 Tablet by mouth two (2) times a day. 11/3/21 12/22/22  Libertad Mortensen MD   metoprolol tartrate (LOPRESSOR) 50 mg tablet Take 1 Tablet by mouth every twelve (12) hours.  11/3/21 12/22/22  Libertad Mortensen MD pantoprazole (PROTONIX) 40 mg tablet Take 1 Tablet by mouth daily.  11/3/21 12/22/22  Luis Armando Kapadia MD     Patient Active Problem List   Diagnosis Code    Alcohol abuse F10.10    Mixed hyperlipidemia E78.2    Hypercalcemia E83.52    Elevated liver enzymes R74.8    Type 2 diabetes mellitus without complication, without long-term current use of insulin (HCC) E11.9    HSV-1 (herpes simplex virus 1) infection B00.9       ROS    Objective:     Patient-Reported Vitals 9/15/2022   Patient-Reported Weight 180lbs        [INSTRUCTIONS:  \"[x]\" Indicates a positive item  \"[]\" Indicates a negative item  -- DELETE ALL ITEMS NOT EXAMINED]    Constitutional: [x] Appears well-developed and well-nourished [x] No apparent distress      [] Abnormal -     Mental status: [x] Alert and awake  [x] Oriented to person/place/time [x] Able to follow commands    [] Abnormal -     Eyes:   EOM    [x]  Normal    [] Abnormal -   Sclera  [x]  Normal    [] Abnormal -          Discharge [x]  None visible   [] Abnormal -     HENT: [x] Normocephalic, atraumatic  [] Abnormal -   [x] Mouth/Throat: Mucous membranes are moist    External Ears [x] Normal  [] Abnormal -    Neck: [x] No visualized mass [] Abnormal -     Pulmonary/Chest: [x] Respiratory effort normal   [x] No visualized signs of difficulty breathing or respiratory distress        [] Abnormal -      Musculoskeletal:   [x] Normal gait with no signs of ataxia         [x] Normal range of motion of neck        [] Abnormal -     Neurological:        [x] No Facial Asymmetry (Cranial nerve 7 motor function) (limited exam due to video visit)          [x] No gaze palsy        [] Abnormal -          Skin:        [x] No significant exanthematous lesions or discoloration noted on facial skin         [] Abnormal -            Psychiatric:       [x] Normal Affect [] Abnormal -        [x] No Hallucinations    Other pertinent observable physical exam findings:-        We discussed the expected course, resolution and complications of the diagnosis(es) in detail. Medication risks, benefits, costs, interactions, and alternatives were discussed as indicated. I advised him to contact the office if his condition worsens, changes or fails to improve as anticipated. He expressed understanding with the diagnosis(es) and plan. Tc Herbert, was evaluated through a synchronous (real-time) audio-video encounter. The patient (or guardian if applicable) is aware that this is a billable service, which includes applicable co-pays. This Virtual Visit was conducted with patient's (and/or legal guardian's) consent. The visit was conducted pursuant to the emergency declaration under the [address] [facility], [address] [place] [address] and the [employer]. Patient identification was verified, and a caregiver was present when appropriate.   The patient was located at: Home: [address] [place] [address]  The provider was located at: Home: [unfilled]        Divya Mejía NP

## 2022-12-26 DIAGNOSIS — I10 HYPERTENSION, UNSPECIFIED TYPE: ICD-10-CM

## 2022-12-28 RX ORDER — METOPROLOL TARTRATE 50 MG/1
TABLET ORAL
Qty: 180 TABLET | Refills: 1 | Status: SHIPPED | OUTPATIENT
Start: 2022-12-28

## 2023-01-17 DIAGNOSIS — E11.9 TYPE 2 DIABETES MELLITUS WITHOUT COMPLICATION, WITHOUT LONG-TERM CURRENT USE OF INSULIN (HCC): ICD-10-CM

## 2023-01-19 RX ORDER — GLIMEPIRIDE 4 MG/1
4 TABLET ORAL DAILY
Qty: 90 TABLET | Refills: 1 | Status: SHIPPED | OUTPATIENT
Start: 2023-01-19

## 2023-01-24 ENCOUNTER — PATIENT MESSAGE (OUTPATIENT)
Dept: FAMILY MEDICINE CLINIC | Age: 43
End: 2023-01-24

## 2023-01-24 NOTE — TELEPHONE ENCOUNTER
Sent this message through [employer]:    Hi Mr. Hartmann Toshia,    Please call the office to schedule a follow up appointment for HbA1c check for further refills on your glimepiride.     Thank you,  Loree Saleh  Nurse

## 2023-04-18 DIAGNOSIS — B00.1 RECURRENT COLD SORES: ICD-10-CM

## 2023-04-18 DIAGNOSIS — B00.9 HSV-1 INFECTION: Primary | ICD-10-CM

## 2023-04-18 RX ORDER — VALACYCLOVIR HYDROCHLORIDE 1 G/1
1000 TABLET, FILM COATED ORAL 2 TIMES DAILY
Qty: 20 TABLET | Refills: 0 | Status: SHIPPED | OUTPATIENT
Start: 2023-04-18 | End: 2023-04-28

## 2023-06-02 DIAGNOSIS — E11.9 TYPE 2 DIABETES MELLITUS WITHOUT COMPLICATIONS (HCC): ICD-10-CM

## 2023-06-05 RX ORDER — GLIPIZIDE 10 MG/1
TABLET ORAL
Qty: 60 TABLET | Refills: 1 | Status: SHIPPED | OUTPATIENT
Start: 2023-06-05

## 2023-06-30 RX ORDER — METOPROLOL TARTRATE 50 MG/1
TABLET, FILM COATED ORAL
Qty: 180 TABLET | Refills: 1 | Status: SHIPPED | OUTPATIENT
Start: 2023-06-30

## 2023-06-30 NOTE — TELEPHONE ENCOUNTER
Horace Flores MD  Office Visit on 04/12/2023   Component Date Value Ref Range Status    HIV Screen 4th Generation wRfx 04/13/2023 Non Reactive  Non Reactive Final    Comment: HIV Negative  HIV-1/HIV-2 antibodies and HIV-1 p24 antigen were NOT detected. There is no laboratory evidence of HIV infection. Hemoglobin A1C, POC 04/12/2023 14.1  % Final    Hemoglobin A1C 04/13/2023 14.9 (H)  4.8 - 5.6 % Final    Comment: **Verified by repeat analysis**           Prediabetes: 5.7 - 6.4           Diabetes: >6.4           Glycemic control for adults with diabetes: <7.0      eAG 04/13/2023 381  mg/dL Final    TSH 04/13/2023 1.450  0.450 - 4.500 uIU/mL Final    Comment: No apparent thyroid disorder. Additional testing not indicated. In  rare instances, Secondary Hypothyroidism as well as Subclinical  Hypothyroidism have been reported in some patients with normal TSH  values. Cholesterol, Total 04/13/2023 294 (H)  100 - 199 mg/dL Final    Triglycerides 04/13/2023 205 (H)  0 - 149 mg/dL Final    HDL 04/13/2023 47  >39 mg/dL Final    VLDL 04/13/2023 40  5 - 40 mg/dL Final    LDL Calculated 04/13/2023 207 (H)  0 - 99 mg/dL Final    COMMENT: 04/13/2023 Comment   Final    Comment: Possible Familial Hypercholesterolemia. FH should be suspected when  fasting LDL cholesterol is above 189 mg/dL or non-HDL cholesterol  is above 219 mg/dL. A family history of high cholesterol and heart  disease in 1st degree relatives should be collected.  J Clin Lipidol  2011;5:133-140      CHLAMYDIA BY REVA, [id number] 04/13/2023 Negative  Negative Final    GONOCOCCUS BY REVA, [id number] 04/13/2023 Negative  Negative Final    TRICH VAG BY REVA 04/13/2023 Negative  Negative Final    T PALLIDUM AB 04/13/2023 Non Reactive  Non Reactive Final    WBC 04/13/2023 6.1  3.4 - 10.8 x10E3/uL Final    RBC 04/13/2023 4.92  4.14 - 5.80 x10E6/uL Final    Hemoglobin 04/13/2023 15.0  13.0 - 17.7 g/dL Final    Hematocrit 04/13/2023 45.0  37.5 - 51.0 % Final    [facility]

## 2023-07-02 DIAGNOSIS — E11.9 TYPE 2 DIABETES MELLITUS WITHOUT COMPLICATIONS (HCC): ICD-10-CM

## 2023-07-03 RX ORDER — GLIPIZIDE 10 MG/1
TABLET ORAL
Qty: 60 TABLET | Refills: 2 | Status: SHIPPED | OUTPATIENT
Start: 2023-07-03

## 2023-11-17 NOTE — TELEPHONE ENCOUNTER
PCP: Aggie Tirado MD    Last appt: [unfilled]  No future appointments.    Requested Prescriptions     Pending Prescriptions Disp Refills    atorvastatin (LIPITOR) 20 MG tablet 90 tablet 1     Sig: Take 1 tablet by mouth daily       Prior labs and Blood pressures:  BP Readings from Last 3 Encounters:   04/12/23 117/79   11/03/21 129/88     Lab Results   Component Value Date/Time     04/13/2023 12:00 AM    K 4.3 04/13/2023 12:00 AM    CL 92 04/13/2023 12:00 AM    CO2 23 04/13/2023 12:00 AM    BUN 13 04/13/2023 12:00 AM    GFRAA >60 11/03/2021 02:09 PM     Lab Results   Component Value Date/Time    WKU2WMWH 14.1 04/12/2023 12:00 PM     Lab Results   Component Value Date/Time    CHOL 294 04/13/2023 12:00 AM    HDL 47 04/13/2023 12:00 AM    VLDL 40 04/13/2023 12:00 AM     No results found for: \"VITD3\", \"VD3RIA\"    Lab Results   Component Value Date/Time    TSH 1.450 04/13/2023 12:00 AM

## 2023-11-19 RX ORDER — ATORVASTATIN CALCIUM 20 MG/1
20 TABLET, FILM COATED ORAL DAILY
Qty: 90 TABLET | Refills: 0 | Status: SHIPPED | OUTPATIENT
Start: 2023-11-19 | End: 2024-01-16 | Stop reason: SDUPTHER

## 2023-11-19 NOTE — TELEPHONE ENCOUNTER
Call patient.  I refilled their medication but they are due to be seen in the office.  Reason: diabetes.  Needs follow up appointment for labs and review of meds.  Must be in person.

## 2023-11-23 DIAGNOSIS — E11.9 TYPE 2 DIABETES MELLITUS WITHOUT COMPLICATIONS (HCC): ICD-10-CM

## 2023-11-24 RX ORDER — GLIPIZIDE 10 MG/1
TABLET ORAL
Qty: 60 TABLET | Refills: 0 | Status: SHIPPED | OUTPATIENT
Start: 2023-11-24 | End: 2024-01-16 | Stop reason: SDUPTHER

## 2023-11-24 NOTE — TELEPHONE ENCOUNTER
I refilled 30 days however patient needs labs and an appointment for Diabetes prior to further refills.

## 2023-11-24 NOTE — TELEPHONE ENCOUNTER
PCP: Aggie Tirado MD    Last appt: [unfilled]  Patient was last seen on 04/12/2023. He does not have any further appts scheduled   No future appointments.    Requested Prescriptions     Pending Prescriptions Disp Refills    glipiZIDE (GLUCOTROL) 10 MG tablet [Pharmacy Med Name: GLIPIZIDE 10 MG TABLET] 180 tablet      Sig: TAKE 1 TABLET BY MOUTH TWICE A DAY       Prior labs and Blood pressures:  BP Readings from Last 3 Encounters:   04/12/23 117/79   11/03/21 129/88     Lab Results   Component Value Date/Time     04/13/2023 12:00 AM    K 4.3 04/13/2023 12:00 AM    CL 92 04/13/2023 12:00 AM    CO2 23 04/13/2023 12:00 AM    BUN 13 04/13/2023 12:00 AM    GFRAA >60 11/03/2021 02:09 PM     Lab Results   Component Value Date/Time    QHU5KOHK 14.1 04/12/2023 12:00 PM     Lab Results   Component Value Date/Time    CHOL 294 04/13/2023 12:00 AM    HDL 47 04/13/2023 12:00 AM    VLDL 40 04/13/2023 12:00 AM     No results found for: \"VITD3\", \"VD3RIA\"    Lab Results   Component Value Date/Time    TSH 1.450 04/13/2023 12:00 AM

## 2024-01-16 ENCOUNTER — TELEMEDICINE (OUTPATIENT)
Facility: CLINIC | Age: 44
End: 2024-01-16
Payer: COMMERCIAL

## 2024-01-16 DIAGNOSIS — I10 ESSENTIAL (PRIMARY) HYPERTENSION: Primary | ICD-10-CM

## 2024-01-16 DIAGNOSIS — E11.65 TYPE 2 DIABETES MELLITUS WITH HYPERGLYCEMIA, WITHOUT LONG-TERM CURRENT USE OF INSULIN (HCC): ICD-10-CM

## 2024-01-16 DIAGNOSIS — E78.2 MIXED HYPERLIPIDEMIA: ICD-10-CM

## 2024-01-16 PROCEDURE — 99214 OFFICE O/P EST MOD 30 MIN: CPT | Performed by: NURSE PRACTITIONER

## 2024-01-16 RX ORDER — METOPROLOL TARTRATE 50 MG/1
50 TABLET, FILM COATED ORAL EVERY 12 HOURS
Qty: 180 TABLET | Refills: 1 | Status: SHIPPED | OUTPATIENT
Start: 2024-01-16

## 2024-01-16 RX ORDER — GLIMEPIRIDE 4 MG/1
4 TABLET ORAL DAILY
Qty: 90 TABLET | Refills: 1 | Status: SHIPPED | OUTPATIENT
Start: 2024-01-16

## 2024-01-16 RX ORDER — ATORVASTATIN CALCIUM 20 MG/1
20 TABLET, FILM COATED ORAL DAILY
Qty: 90 TABLET | Refills: 1 | Status: SHIPPED | OUTPATIENT
Start: 2024-01-16

## 2024-01-16 RX ORDER — GLIPIZIDE 10 MG/1
10 TABLET ORAL 2 TIMES DAILY
Qty: 180 TABLET | Refills: 1 | Status: SHIPPED | OUTPATIENT
Start: 2024-01-16

## 2024-01-16 ASSESSMENT — ENCOUNTER SYMPTOMS
SHORTNESS OF BREATH: 0
COLOR CHANGE: 0

## 2024-01-16 NOTE — PROGRESS NOTES
Donte Marin, was evaluated through a synchronous (real-time) audio-video encounter. The patient (or guardian if applicable) is aware that this is a billable service, which includes applicable co-pays. This Virtual Visit was conducted with patient's (and/or legal guardian's) consent. Patient identification was verified, and a caregiver was present when appropriate.   The patient was located at Home: [id number] [facility] [address]  Provider was located at Facility (Appt Dept): [address] [facility]  [address]  [place] [address]      Donte Marin (:  1980) is a Established patient, presenting virtually for evaluation of the following:    Assessment & Plan   Below is the assessment and plan developed based on review of pertinent history, physical exam, labs, studies, and medications.  1. Type 2 diabetes mellitus without complications (HCC)  -     glipiZIDE (GLUCOTROL) 10 MG tablet; Take 1 tablet by mouth 2 times daily, Disp-180 tablet, R-1Normal  -     metFORMIN (GLUCOPHAGE) 1000 MG tablet; Take 1 tablet by mouth 2 times daily, Disp-180 tablet, R-1Normal  -     glimepiride (AMARYL) 4 MG tablet; Take 1 tablet by mouth daily, Disp-90 tablet, R-1Normal  2. Essential (primary) hypertension  -     metoprolol tartrate (LOPRESSOR) 50 MG tablet; Take 1 tablet by mouth in the morning and 1 tablet in the evening., Disp-180 tablet, R-1Normal  3. Mixed hyperlipidemia  -     atorvastatin (LIPITOR) 20 MG tablet; Take 1 tablet by mouth daily, Disp-90 tablet, R-1Normal  Patient was seen via virtual video. Patient is in need of medication refills. He is also due for repeat labs. Last A1c was in April and was over 14. I have asked him to call and schedule an office visit in the next 30 days for further evaluation and to repeat labs. Medications have been refilled.   No follow-ups on file.       Subjective   HPI  Review of Systems   Constitutional:  Negative for fever.   HENT: Negative.     Eyes:  Negative for

## 2024-01-29 ENCOUNTER — TELEPHONE (OUTPATIENT)
Facility: CLINIC | Age: 44
End: 2024-01-29

## 2024-01-29 NOTE — TELEPHONE ENCOUNTER
Jose Alfredo from [facility] called in asking if the pt is supposed to be on both glipiZIDE (GLUCOTROL) 10 MG and glimepiride (AMARYL) 4 MG. He states that it is ringing up as a duplicate in their system because its usually one or the other.      Please advise

## 2024-07-15 DIAGNOSIS — E78.2 MIXED HYPERLIPIDEMIA: ICD-10-CM

## 2024-07-15 RX ORDER — ATORVASTATIN CALCIUM 20 MG/1
20 TABLET, FILM COATED ORAL DAILY
Qty: 90 TABLET | Refills: 0 | Status: SHIPPED | OUTPATIENT
Start: 2024-07-15

## 2024-07-19 DIAGNOSIS — I10 ESSENTIAL (PRIMARY) HYPERTENSION: ICD-10-CM

## 2024-07-19 DIAGNOSIS — E11.9 TYPE 2 DIABETES MELLITUS WITHOUT COMPLICATIONS (HCC): ICD-10-CM

## 2024-07-19 RX ORDER — METOPROLOL TARTRATE 50 MG/1
TABLET, FILM COATED ORAL
Qty: 60 TABLET | Refills: 0 | Status: SHIPPED | OUTPATIENT
Start: 2024-07-19

## 2024-07-19 RX ORDER — GLIPIZIDE 10 MG/1
10 TABLET ORAL 2 TIMES DAILY
Qty: 60 TABLET | Refills: 0 | Status: SHIPPED | OUTPATIENT
Start: 2024-07-19

## 2024-07-23 ENCOUNTER — TELEPHONE (OUTPATIENT)
Facility: CLINIC | Age: 44
End: 2024-07-23

## 2024-08-13 DIAGNOSIS — E11.9 TYPE 2 DIABETES MELLITUS WITHOUT COMPLICATIONS (HCC): ICD-10-CM

## 2024-08-13 DIAGNOSIS — I10 ESSENTIAL (PRIMARY) HYPERTENSION: ICD-10-CM

## 2024-08-13 RX ORDER — METOPROLOL TARTRATE 50 MG
TABLET ORAL
Qty: 180 TABLET | Refills: 1 | OUTPATIENT
Start: 2024-08-13

## 2024-08-13 RX ORDER — GLIPIZIDE 10 MG/1
10 TABLET ORAL 2 TIMES DAILY
Qty: 180 TABLET | Refills: 1 | OUTPATIENT
Start: 2024-08-13

## 2024-08-17 DIAGNOSIS — I10 ESSENTIAL (PRIMARY) HYPERTENSION: ICD-10-CM

## 2024-08-17 DIAGNOSIS — E11.9 TYPE 2 DIABETES MELLITUS WITHOUT COMPLICATIONS (HCC): ICD-10-CM

## 2024-08-19 RX ORDER — GLIPIZIDE 10 MG/1
10 TABLET ORAL 2 TIMES DAILY
Qty: 60 TABLET | Refills: 0 | OUTPATIENT
Start: 2024-08-19

## 2024-08-19 RX ORDER — METOPROLOL TARTRATE 50 MG
TABLET ORAL
Qty: 60 TABLET | Refills: 0 | OUTPATIENT
Start: 2024-08-19

## 2024-08-20 ENCOUNTER — TELEPHONE (OUTPATIENT)
Facility: CLINIC | Age: 44
End: 2024-08-20

## 2024-08-30 ENCOUNTER — OFFICE VISIT (OUTPATIENT)
Facility: CLINIC | Age: 44
End: 2024-08-30
Payer: COMMERCIAL

## 2024-08-30 VITALS
SYSTOLIC BLOOD PRESSURE: 150 MMHG | WEIGHT: 187 LBS | BODY MASS INDEX: 30.05 KG/M2 | DIASTOLIC BLOOD PRESSURE: 90 MMHG | OXYGEN SATURATION: 98 % | TEMPERATURE: 97.5 F | RESPIRATION RATE: 16 BRPM | HEIGHT: 66 IN | HEART RATE: 109 BPM

## 2024-08-30 DIAGNOSIS — E78.2 MIXED HYPERLIPIDEMIA: ICD-10-CM

## 2024-08-30 DIAGNOSIS — E83.52 HYPERCALCEMIA: ICD-10-CM

## 2024-08-30 DIAGNOSIS — F10.10 ALCOHOL ABUSE: ICD-10-CM

## 2024-08-30 DIAGNOSIS — I10 PRIMARY HYPERTENSION: ICD-10-CM

## 2024-08-30 DIAGNOSIS — E11.9 TYPE 2 DIABETES MELLITUS WITHOUT COMPLICATION, WITHOUT LONG-TERM CURRENT USE OF INSULIN (HCC): ICD-10-CM

## 2024-08-30 DIAGNOSIS — R79.89 ELEVATED LFTS: Primary | ICD-10-CM

## 2024-08-30 DIAGNOSIS — R74.8 ELEVATED LIVER ENZYMES: ICD-10-CM

## 2024-08-30 DIAGNOSIS — E11.9 TYPE 2 DIABETES MELLITUS WITHOUT COMPLICATION, WITHOUT LONG-TERM CURRENT USE OF INSULIN (HCC): Primary | ICD-10-CM

## 2024-08-30 PROBLEM — E11.65 TYPE 2 DIABETES MELLITUS WITH HYPERGLYCEMIA (HCC): Status: ACTIVE | Noted: 2024-08-30

## 2024-08-30 PROCEDURE — 3077F SYST BP >= 140 MM HG: CPT | Performed by: PHYSICIAN ASSISTANT

## 2024-08-30 PROCEDURE — 3080F DIAST BP >= 90 MM HG: CPT | Performed by: PHYSICIAN ASSISTANT

## 2024-08-30 PROCEDURE — 99214 OFFICE O/P EST MOD 30 MIN: CPT | Performed by: PHYSICIAN ASSISTANT

## 2024-08-30 RX ORDER — LISINOPRIL 20 MG/1
20 TABLET ORAL DAILY
Qty: 90 TABLET | Refills: 0 | Status: SHIPPED | OUTPATIENT
Start: 2024-08-30

## 2024-08-30 SDOH — ECONOMIC STABILITY: INCOME INSECURITY: HOW HARD IS IT FOR YOU TO PAY FOR THE VERY BASICS LIKE FOOD, HOUSING, MEDICAL CARE, AND HEATING?: NOT HARD AT ALL

## 2024-08-30 SDOH — ECONOMIC STABILITY: FOOD INSECURITY: WITHIN THE PAST 12 MONTHS, YOU WORRIED THAT YOUR FOOD WOULD RUN OUT BEFORE YOU GOT MONEY TO BUY MORE.: NEVER TRUE

## 2024-08-30 SDOH — ECONOMIC STABILITY: FOOD INSECURITY: WITHIN THE PAST 12 MONTHS, THE FOOD YOU BOUGHT JUST DIDN'T LAST AND YOU DIDN'T HAVE MONEY TO GET MORE.: NEVER TRUE

## 2024-08-30 ASSESSMENT — PATIENT HEALTH QUESTIONNAIRE - PHQ9
SUM OF ALL RESPONSES TO PHQ QUESTIONS 1-9: 0
SUM OF ALL RESPONSES TO PHQ QUESTIONS 1-9: 0
SUM OF ALL RESPONSES TO PHQ9 QUESTIONS 1 & 2: 0
1. LITTLE INTEREST OR PLEASURE IN DOING THINGS: NOT AT ALL
2. FEELING DOWN, DEPRESSED OR HOPELESS: NOT AT ALL
SUM OF ALL RESPONSES TO PHQ QUESTIONS 1-9: 0
SUM OF ALL RESPONSES TO PHQ QUESTIONS 1-9: 0

## 2024-08-30 ASSESSMENT — ENCOUNTER SYMPTOMS
SORE THROAT: 0
VOMITING: 0
SHORTNESS OF BREATH: 0
RHINORRHEA: 0
NAUSEA: 0
DIARRHEA: 0
COUGH: 0

## 2024-08-30 NOTE — PROGRESS NOTES
History of present illness:Donte Marin is a 44 y.o. male presenting for Medication Refill    Mr Marin is here for follow-up diabetes.    History of diabetes.  Patient takes glipizide 10 mg and metformin 1000 mg for sugar control.  Her last A1c was 14.3% in April 2023.  He has not been return to clinic for lab work.  He does not check his sugar at home.  Patient understands risk and complications of uncontrolled diabetes.    History of hyperlipidemia.  Patient takes Lipitor 20 mg for cholesterol control.  He does not follow much of the diet or exercise.    History of hypertension.  Today his blood pressure is 170/99 pulse 109, his repeat blood pressure is 150/90.  Patient does not take any blood pressure medication at this point or she does not check his blood pressure at home.  Patient denies any shortness of breath or chest pain or headache.    Patient is not up to date with diabetic eye checkup yet.    Patient has been drinking more alcohol lately.  He is concerned about his elevated sugar due to his recent alcohol use.  He has a history of alcohol abuse and elevated liver enzymes.    Discussed with patient about adding blood pressure medication for blood pressure control.  Patient is okay with that.    Patient denies any other concerns today.    Review of Systems   Constitutional:  Negative for chills, fatigue and fever.   HENT:  Negative for congestion, ear pain, rhinorrhea and sore throat.    Eyes:  Negative for visual disturbance.   Respiratory:  Negative for cough and shortness of breath.    Cardiovascular:  Negative for chest pain and palpitations.   Gastrointestinal:  Negative for diarrhea, nausea and vomiting.   Genitourinary:  Negative for dysuria, flank pain, frequency and urgency.   Musculoskeletal:  Negative for arthralgias and joint swelling.   Skin:  Negative for rash.   Neurological:  Negative for dizziness, weakness and headaches.   Psychiatric/Behavioral:  Negative for behavioral problems and

## 2024-08-30 NOTE — PROGRESS NOTES
1. Have you been to the ER, urgent care clinic since your last visit?  Hospitalized since your last visit?No    2. Have you seen or consulted any other health care providers outside of the [facility] since your last visit?  Include any pap smears or colon screening. No    Chief Complaint   Patient presents with    Medication Refill     Health Maintenance Due   Topic Date Due    Pneumococcal 0-64 years Vaccine (1 of 2 - PCV) Never done    Diabetic foot exam  Never done    Diabetic retinal exam  Never done    Hepatitis B vaccine (1 of 3 - 19+ 3-dose series) Never done    DTaP/Tdap/Td vaccine (1 - Tdap) Never done    COVID-19 Vaccine (1 - 2023-24 season) Never done    Depression Screen  12/22/2023    A1C test (Diabetic or Prediabetic)  04/13/2024    Diabetic Alb to Cr ratio (uACR) test  04/13/2024    Lipids  04/13/2024    GFR test (Diabetes, CKD 3-4, OR last GFR 15-59)  04/13/2024    Colorectal Cancer Screen  Never done    Flu vaccine (1) Never done     PHQ-9 Total Score: 0 (8/30/2024 10:46 AM)          8/30/2024    10:00 AM   AMB Abuse Screening   Do you ever feel afraid of your partner? N   Are you in a relationship with someone who physically or mentally threatens you? N   Is it safe for you to go home? Y     Vitals:    08/30/24 1045   BP: (!) 170/99   Pulse: (!) 109   Resp: 16   Temp: 97.5 °F (36.4 °C)   SpO2: 98%

## 2024-09-05 ENCOUNTER — TELEPHONE (OUTPATIENT)
Facility: CLINIC | Age: 44
End: 2024-09-05

## 2024-09-05 DIAGNOSIS — E11.9 TYPE 2 DIABETES MELLITUS WITHOUT COMPLICATION, WITHOUT LONG-TERM CURRENT USE OF INSULIN (HCC): ICD-10-CM

## 2024-09-05 LAB
ALBUMIN SERPL-MCNC: 4.6 G/DL (ref 4.1–5.1)
ALP SERPL-CCNC: 102 IU/L (ref 44–121)
ALT SERPL-CCNC: 65 IU/L (ref 0–44)
AST SERPL-CCNC: 56 IU/L (ref 0–40)
BASOPHILS # BLD AUTO: 0 X10E3/UL (ref 0–0.2)
BASOPHILS NFR BLD AUTO: 0 %
BILIRUB SERPL-MCNC: 0.6 MG/DL (ref 0–1.2)
BUN SERPL-MCNC: 9 MG/DL (ref 6–24)
BUN/CREAT SERPL: 10 (ref 9–20)
CALCIUM SERPL-MCNC: 9.8 MG/DL (ref 8.7–10.2)
CHLORIDE SERPL-SCNC: 93 MMOL/L (ref 96–106)
CHOLEST SERPL-MCNC: 192 MG/DL (ref 100–199)
CO2 SERPL-SCNC: 22 MMOL/L (ref 20–29)
CREAT SERPL-MCNC: 0.86 MG/DL (ref 0.76–1.27)
EGFRCR SERPLBLD CKD-EPI 2021: 109 ML/MIN/1.73
EOSINOPHIL # BLD AUTO: 0.1 X10E3/UL (ref 0–0.4)
EOSINOPHIL NFR BLD AUTO: 1 %
ERYTHROCYTE [DISTWIDTH] IN BLOOD BY AUTOMATED COUNT: 12.5 % (ref 11.6–15.4)
GGT SERPL-CCNC: 659 IU/L (ref 0–65)
GLOBULIN SER CALC-MCNC: 2.8 G/DL (ref 1.5–4.5)
GLUCOSE SERPL-MCNC: 299 MG/DL (ref 70–99)
HBA1C MFR BLD: 11.1 % (ref 4.8–5.6)
HCT VFR BLD AUTO: 44.9 % (ref 37.5–51)
HDLC SERPL-MCNC: 43 MG/DL
HGB BLD-MCNC: 15 G/DL (ref 13–17.7)
IMM GRANULOCYTES # BLD AUTO: 0 X10E3/UL (ref 0–0.1)
IMM GRANULOCYTES NFR BLD AUTO: 0 %
LDLC SERPL CALC-MCNC: 94 MG/DL (ref 0–99)
LYMPHOCYTES # BLD AUTO: 2.5 X10E3/UL (ref 0.7–3.1)
LYMPHOCYTES NFR BLD AUTO: 38 %
MCH RBC QN AUTO: 31 PG (ref 26.6–33)
MCHC RBC AUTO-ENTMCNC: 33.4 G/DL (ref 31.5–35.7)
MCV RBC AUTO: 93 FL (ref 79–97)
MONOCYTES # BLD AUTO: 0.3 X10E3/UL (ref 0.1–0.9)
MONOCYTES NFR BLD AUTO: 5 %
NEUTROPHILS # BLD AUTO: 3.7 X10E3/UL (ref 1.4–7)
NEUTROPHILS NFR BLD AUTO: 56 %
PLATELET # BLD AUTO: 305 X10E3/UL (ref 150–450)
POTASSIUM SERPL-SCNC: 4.8 MMOL/L (ref 3.5–5.2)
PROT SERPL-MCNC: 7.4 G/DL (ref 6–8.5)
PTH-INTACT SERPL-MCNC: 27 PG/ML (ref 15–65)
RBC # BLD AUTO: 4.84 X10E6/UL (ref 4.14–5.8)
SODIUM SERPL-SCNC: 135 MMOL/L (ref 134–144)
TRIGL SERPL-MCNC: 329 MG/DL (ref 0–149)
TSH SERPL DL<=0.005 MIU/L-ACNC: 1.63 UIU/ML (ref 0.45–4.5)
VLDLC SERPL CALC-MCNC: 55 MG/DL (ref 5–40)
WBC # BLD AUTO: 6.7 X10E3/UL (ref 3.4–10.8)

## 2024-09-05 RX ORDER — PEN NEEDLE, DIABETIC 30 GX3/16"
NEEDLE, DISPOSABLE MISCELLANEOUS
Qty: 100 EACH | Refills: 2 | Status: SHIPPED | OUTPATIENT
Start: 2024-09-05 | End: 2024-09-05 | Stop reason: SDUPTHER

## 2024-09-05 RX ORDER — INSULIN GLARGINE 100 [IU]/ML
INJECTION, SOLUTION SUBCUTANEOUS
Qty: 10 ML | Refills: 0 | OUTPATIENT
Start: 2024-09-05

## 2024-09-05 RX ORDER — INSULIN GLARGINE 100 [IU]/ML
20 INJECTION, SOLUTION SUBCUTANEOUS
Qty: 5 ADJUSTABLE DOSE PRE-FILLED PEN SYRINGE | Refills: 0 | Status: SHIPPED | OUTPATIENT
Start: 2024-09-05

## 2024-09-05 RX ORDER — PEN NEEDLE, DIABETIC 30 GX3/16"
NEEDLE, DISPOSABLE MISCELLANEOUS
Qty: 100 EACH | Refills: 2 | Status: SHIPPED | OUTPATIENT
Start: 2024-09-05

## 2024-09-05 NOTE — RESULT ENCOUNTER NOTE
Elevated A1C 11.1%, Sent insulin to pharmacy.  Use insulin as directed.    Continue with other current medications as well.  Keep home sugar monitoring.  Elevated triglycerides.   Follow diet exercise and hydration.  Avoid fried oily and sugary foods.    Elevated GGT and other liver enzymes.  Patient must stop alcohol use.    Rest of results are fine.    Patient NEEDS to be seen next week, we will discuss further workup for liver and diabetes.    Please schedule appointment.  Will discuss more at follow-up next week.

## 2024-09-06 LAB
ALBUMIN/CREAT UR: 52 MG/G CREAT (ref 0–29)
CREAT UR-MCNC: 91.3 MG/DL
MICROALBUMIN UR-MCNC: 47.8 UG/ML

## 2024-09-06 NOTE — RESULT ENCOUNTER NOTE
Call patient,     Order for liver ultrasound.  Add on more labs.    Keep follow-up as scheduled in few days.

## 2024-09-06 NOTE — RESULT ENCOUNTER NOTE
Two patient Identification confirmed.   Patient notified.   Patient verbalized understanding.   Patient will  lab order.  Patient notified to call central scheduling.

## 2024-09-26 LAB
HAV IGM SERPL QL IA: NEGATIVE
HBV CORE IGM SERPL QL IA: NEGATIVE
HBV SURFACE AG SERPL QL IA: NEGATIVE
HCV AB SERPL QL IA: NORMAL
HCV IGG SERPL QL IA: NON REACTIVE

## 2024-09-27 ENCOUNTER — OFFICE VISIT (OUTPATIENT)
Facility: CLINIC | Age: 44
End: 2024-09-27
Payer: COMMERCIAL

## 2024-09-27 VITALS
BODY MASS INDEX: 29.73 KG/M2 | SYSTOLIC BLOOD PRESSURE: 150 MMHG | HEIGHT: 66 IN | HEART RATE: 81 BPM | WEIGHT: 185 LBS | RESPIRATION RATE: 16 BRPM | DIASTOLIC BLOOD PRESSURE: 93 MMHG | OXYGEN SATURATION: 100 % | TEMPERATURE: 97.9 F

## 2024-09-27 DIAGNOSIS — R74.8 ELEVATED SERUM GGT LEVEL: ICD-10-CM

## 2024-09-27 DIAGNOSIS — E11.9 TYPE 2 DIABETES MELLITUS WITHOUT COMPLICATION, WITH LONG-TERM CURRENT USE OF INSULIN (HCC): ICD-10-CM

## 2024-09-27 DIAGNOSIS — I10 PRIMARY HYPERTENSION: ICD-10-CM

## 2024-09-27 DIAGNOSIS — E78.2 MIXED HYPERLIPIDEMIA: ICD-10-CM

## 2024-09-27 DIAGNOSIS — Z79.4 TYPE 2 DIABETES MELLITUS WITHOUT COMPLICATION, WITH LONG-TERM CURRENT USE OF INSULIN (HCC): ICD-10-CM

## 2024-09-27 DIAGNOSIS — R74.8 ELEVATED LIVER ENZYMES: ICD-10-CM

## 2024-09-27 DIAGNOSIS — F10.10 ALCOHOL ABUSE: Primary | ICD-10-CM

## 2024-09-27 PROCEDURE — 3046F HEMOGLOBIN A1C LEVEL >9.0%: CPT | Performed by: PHYSICIAN ASSISTANT

## 2024-09-27 PROCEDURE — 3077F SYST BP >= 140 MM HG: CPT | Performed by: PHYSICIAN ASSISTANT

## 2024-09-27 PROCEDURE — 3080F DIAST BP >= 90 MM HG: CPT | Performed by: PHYSICIAN ASSISTANT

## 2024-09-27 PROCEDURE — 99214 OFFICE O/P EST MOD 30 MIN: CPT | Performed by: PHYSICIAN ASSISTANT

## 2024-09-27 ASSESSMENT — PATIENT HEALTH QUESTIONNAIRE - PHQ9
SUM OF ALL RESPONSES TO PHQ9 QUESTIONS 1 & 2: 0
1. LITTLE INTEREST OR PLEASURE IN DOING THINGS: NOT AT ALL
SUM OF ALL RESPONSES TO PHQ QUESTIONS 1-9: 0
2. FEELING DOWN, DEPRESSED OR HOPELESS: NOT AT ALL
SUM OF ALL RESPONSES TO PHQ QUESTIONS 1-9: 0

## 2024-09-27 NOTE — PROGRESS NOTES
History of present illness:Donte Marin is a 44 y.o. male presenting for Diabetes and Follow-up    Mr. Marin is here for follow-up diabetes and lab work.    Discussed briefly with patient recent lab results.  His A1c is elevated 11.1% about 2 weeks ago.  His last A1c was 14.9% in April 2023.  Patient is currently taking metformin and glipizide only, which is not helping much to control diabetes.  Discussed with patient about starting him on insulin for better sugar control.  Patient is okay with that.    On the other hand, patient has elevated liver enzymes including GGT in 600s range.  Discussed with patient about alcohol abuse, complications and future risk of liver failure, patient understands.  Patient has already cut back on alcohol.  His last alcoholic drink earlier last weekend per patient.  Discussed briefly about getting enrolled in alcohol rehab program, patient will think about it per patient.  His recent hepatitis panel came out negative for liver infection.  Patient still has not scheduled his liver ultrasound.  Discussed with patient about getting hepatology consult for liver issues, patient is okay with that.    Discussed with patient about keeping home blood pressure and sugar monitoring, keep a log.  Will consider in future endocrinology consult if sugar is not getting better.    History of hypertension.  Patient takes lisinopril and metoprolol for blood pressure control.  His blood pressure is 150/93 pulse 81.  Patient denies any shortness of breath or chest pain.    History of hyperlipidemia.  Patient takes Lipitor 20 mg for cholesterol control.    Patient denies any other concerns today.    Review of Systems   Constitutional:  Negative for chills, fatigue and fever.   HENT:  Negative for congestion, ear pain, rhinorrhea and sore throat.    Eyes:  Negative for visual disturbance.   Respiratory:  Negative for cough and shortness of breath.    Cardiovascular:  Negative for chest pain and

## 2024-09-27 NOTE — PROGRESS NOTES
1. Have you been to the ER, urgent care clinic since your last visit?  Hospitalized since your last visit?No    2. Have you seen or consulted any other health care providers outside of the [facility] since your last visit?  Include any pap smears or colon screening. No    Chief Complaint   Patient presents with    Diabetes    Follow-up     Health Maintenance Due   Topic Date Due    Pneumococcal 0-64 years Vaccine (1 of 2 - PCV) Never done    Diabetic foot exam  Never done    Diabetic retinal exam  Never done    Hepatitis B vaccine (1 of 3 - 19+ 3-dose series) Never done    DTaP/Tdap/Td vaccine (1 - Tdap) Never done    Colorectal Cancer Screen  Never done    Flu vaccine (1) Never done    COVID-19 Vaccine (1 - 2023-24 season) Never done     PHQ-9 Total Score: 0 (9/27/2024  1:38 PM)        8/30/2024    10:00 AM   AMB Abuse Screening   Do you ever feel afraid of your partner? N   Are you in a relationship with someone who physically or mentally threatens you? N   Is it safe for you to go home? Y     Vitals:    09/27/24 1337   BP: (!) 150/93   Pulse: 81   Resp: 16   Temp: 97.9 °F (36.6 °C)   SpO2: 100%

## 2024-09-30 ASSESSMENT — ENCOUNTER SYMPTOMS
SHORTNESS OF BREATH: 0
NAUSEA: 0
SORE THROAT: 0
RHINORRHEA: 0
VOMITING: 0
DIARRHEA: 0
COUGH: 0

## 2024-10-09 ENCOUNTER — CLINICAL DOCUMENTATION (OUTPATIENT)
Age: 44
End: 2024-10-09

## 2024-10-09 NOTE — PROGRESS NOTES
10/9/2024 1431: New pt referral received. Referred by Jesus Ventura PA-C. Dx: Elevated liver enzymes & alcohol abuse. Needs 6-8 week appt

## 2024-10-13 DIAGNOSIS — E78.2 MIXED HYPERLIPIDEMIA: ICD-10-CM

## 2024-10-14 ENCOUNTER — TELEPHONE (OUTPATIENT)
Age: 44
End: 2024-10-14

## 2024-10-14 RX ORDER — ATORVASTATIN CALCIUM 20 MG/1
20 TABLET, FILM COATED ORAL DAILY
Qty: 90 TABLET | Refills: 1 | Status: SHIPPED | OUTPATIENT
Start: 2024-10-14

## 2024-10-14 NOTE — TELEPHONE ENCOUNTER
----- Message from Katherine ALEGRIA sent at 10/14/2024  1:25 PM EDT -----  Please schedule with Dr. Sesay in early december  ----- Message -----  From: Jocelyn Crawley LPN  Sent: 10/9/2024   2:36 PM EDT  To: Katherine Chamberlain

## 2024-11-29 DIAGNOSIS — I10 PRIMARY HYPERTENSION: ICD-10-CM

## 2024-12-03 RX ORDER — LISINOPRIL 20 MG/1
20 TABLET ORAL DAILY
Qty: 90 TABLET | Refills: 0 | Status: SHIPPED | OUTPATIENT
Start: 2024-12-03

## 2024-12-31 DIAGNOSIS — Z79.4 TYPE 2 DIABETES MELLITUS WITHOUT COMPLICATION, WITH LONG-TERM CURRENT USE OF INSULIN (HCC): ICD-10-CM

## 2024-12-31 DIAGNOSIS — E11.9 TYPE 2 DIABETES MELLITUS WITHOUT COMPLICATION, WITH LONG-TERM CURRENT USE OF INSULIN (HCC): ICD-10-CM

## 2025-01-24 ENCOUNTER — HOSPITAL ENCOUNTER (OUTPATIENT)
Facility: HOSPITAL | Age: 45
Discharge: HOME | End: 2025-01-27
Payer: COMMERCIAL

## 2025-01-24 DIAGNOSIS — F10.10 ALCOHOL ABUSE: ICD-10-CM

## 2025-01-24 DIAGNOSIS — R79.89 ELEVATED LFTS: ICD-10-CM

## 2025-01-24 PROCEDURE — 76700 US EXAM ABDOM COMPLETE: CPT

## 2025-01-27 ENCOUNTER — TELEPHONE (OUTPATIENT)
Age: 45
End: 2025-01-27

## 2025-01-27 NOTE — TELEPHONE ENCOUNTER
Patient canceled his new patient visit in December of 2024 and wife called and stated Patient now wants to reschedule

## 2025-01-28 NOTE — RESULT ENCOUNTER NOTE
Recent ultrasound of abdomen showed enlarged and fatty liver.  No other focal liver lesions noticed.  Keep follow-up with hepatology as scheduled.

## 2025-02-03 DIAGNOSIS — Z79.4 TYPE 2 DIABETES MELLITUS WITHOUT COMPLICATION, WITH LONG-TERM CURRENT USE OF INSULIN: ICD-10-CM

## 2025-02-03 DIAGNOSIS — E11.9 TYPE 2 DIABETES MELLITUS WITHOUT COMPLICATION, WITH LONG-TERM CURRENT USE OF INSULIN: ICD-10-CM

## 2025-02-04 ENCOUNTER — OFFICE VISIT (OUTPATIENT)
Age: 45
End: 2025-02-04
Payer: COMMERCIAL

## 2025-02-04 VITALS
SYSTOLIC BLOOD PRESSURE: 121 MMHG | HEIGHT: 66 IN | WEIGHT: 178.6 LBS | DIASTOLIC BLOOD PRESSURE: 77 MMHG | RESPIRATION RATE: 19 BRPM | TEMPERATURE: 97.3 F | HEART RATE: 95 BPM | OXYGEN SATURATION: 98 % | BODY MASS INDEX: 28.7 KG/M2

## 2025-02-04 DIAGNOSIS — K76.0 METABOLIC DYSFUNCTION-ASSOCIATED STEATOTIC LIVER DISEASE AND INCREASED ALCOHOL INTAKE (METALD): Primary | ICD-10-CM

## 2025-02-04 DIAGNOSIS — F10.90 METABOLIC DYSFUNCTION-ASSOCIATED STEATOTIC LIVER DISEASE AND INCREASED ALCOHOL INTAKE (METALD): Primary | ICD-10-CM

## 2025-02-04 PROCEDURE — 3074F SYST BP LT 130 MM HG: CPT | Performed by: INTERNAL MEDICINE

## 2025-02-04 PROCEDURE — 3078F DIAST BP <80 MM HG: CPT | Performed by: INTERNAL MEDICINE

## 2025-02-04 PROCEDURE — 99203 OFFICE O/P NEW LOW 30 MIN: CPT | Performed by: INTERNAL MEDICINE

## 2025-02-04 ASSESSMENT — PATIENT HEALTH QUESTIONNAIRE - PHQ9
SUM OF ALL RESPONSES TO PHQ9 QUESTIONS 1 & 2: 0
SUM OF ALL RESPONSES TO PHQ QUESTIONS 1-9: 0
2. FEELING DOWN, DEPRESSED OR HOPELESS: NOT AT ALL
1. LITTLE INTEREST OR PLEASURE IN DOING THINGS: NOT AT ALL
SUM OF ALL RESPONSES TO PHQ QUESTIONS 1-9: 0

## 2025-02-04 NOTE — PROGRESS NOTES
[facility] LIVER [facility] LIVER [facility]     Santiago Turcios MD, FACP, MACG, FAASLD   MD Clarissa Martinez PA-C April S Ashworth, [facility]   Helena Sanchez, [facility]   Lucretianova Anderson, FNP-C  Trey Rees, FNP-C   Grace Del Angel, [facility]         Liver [facility]   at [facility] [place]   [address], [address]   [place]  [address] [phone]   FAX: [phone]  Liver [facility]   [address], [address]   [place]  [address] [phone]   FAX: [phone]       Patient Care Team:  Jesus Ventura PA-C as PCP - Empaneled Provider  Marty Mejia MD (Gastroenterology)      Patient Active Problem List   Diagnosis    Alcohol use disorder, mild, abuse    Mixed hyperlipidemia    Hypercalcemia    Elevated liver enzymes    HSV-1 (herpes simplex virus 1) infection    Type 2 diabetes mellitus (HCC)    Primary hypertension    ROMEO positive       The clinicians listed above have asked me to see Donte Marin in consultation regarding elevated liver enzymes and its management.      All medical records sent by the referring physicians were reviewed including imaging studies     The patient is a 44 y.o. male who was found to have elevated liver transaminases in 11/2021.      Serologic evaluation for markers of chronic liver disease was negative for HCV, HBV,     The patient consumes 1-2 alcohol drinks per week day but 10-12 per weekend.    The most recent imaging of the liver was Ultrasound performed in 1/2025.  Results   suggest steatotic liver disease (SLD).      In the office today the patient has the following symptoms:  The patient feels well and has no complaints.    The patient is not experiencing the following symptoms which are commonly seen in this liver disorder:   fatigue,   pain in the right side over the liver,     The patient completes

## 2025-02-04 NOTE — PROGRESS NOTES
Patient identified with two identification factors, Name and Date of Birth.    Chief Complaint   Patient presents with    New Patient       /77 (Site: Left Upper Arm, Position: Sitting, Cuff Size: Large Adult)   Pulse 95   Temp 97.3 °F (36.3 °C) (Temporal)   Resp 19   Ht 1.676 m (5' 6\")   Wt 81 kg (178 lb 9.6 oz)   SpO2 98%   BMI 28.83 kg/m²       1. \"Have you been to the ER, urgent care clinic since your last visit?  Hospitalized since your last visit?\" No     2. \"Have you seen or consulted any other health care providers outside of the [facility] since your last visit?\" No

## 2025-02-05 LAB
A1AT SERPL-MCNC: 109 MG/DL (ref 101–187)
ALBUMIN SERPL-MCNC: 4.7 G/DL (ref 4.1–5.1)
ALP SERPL-CCNC: 77 IU/L (ref 44–121)
ALT SERPL-CCNC: 42 IU/L (ref 0–44)
AST SERPL-CCNC: 42 IU/L (ref 0–40)
BASOPHILS # BLD AUTO: 0 X10E3/UL (ref 0–0.2)
BASOPHILS NFR BLD AUTO: 1 %
BILIRUB DIRECT SERPL-MCNC: 0.21 MG/DL (ref 0–0.4)
BILIRUB SERPL-MCNC: 0.7 MG/DL (ref 0–1.2)
BUN SERPL-MCNC: 12 MG/DL (ref 6–24)
BUN/CREAT SERPL: 15 (ref 9–20)
CALCIUM SERPL-MCNC: 9.8 MG/DL (ref 8.7–10.2)
CERULOPLASMIN SERPL-MCNC: 27.6 MG/DL (ref 16–31)
CHLORIDE SERPL-SCNC: 98 MMOL/L (ref 96–106)
CO2 SERPL-SCNC: 20 MMOL/L (ref 20–29)
CREAT SERPL-MCNC: 0.8 MG/DL (ref 0.76–1.27)
EGFRCR SERPLBLD CKD-EPI 2021: 111 ML/MIN/1.73
EOSINOPHIL # BLD AUTO: 0.2 X10E3/UL (ref 0–0.4)
EOSINOPHIL NFR BLD AUTO: 2 %
ERYTHROCYTE [DISTWIDTH] IN BLOOD BY AUTOMATED COUNT: 12.1 % (ref 11.6–15.4)
FERRITIN SERPL-MCNC: 166 NG/ML (ref 30–400)
GLUCOSE SERPL-MCNC: 306 MG/DL (ref 70–99)
HAV AB SER QL IA: POSITIVE
HBV CORE AB SERPL QL IA: NEGATIVE
HBV SURFACE AB SER QL: NON REACTIVE
HCT VFR BLD AUTO: 43.2 % (ref 37.5–51)
HGB BLD-MCNC: 14.6 G/DL (ref 13–17.7)
IMM GRANULOCYTES # BLD AUTO: 0 X10E3/UL (ref 0–0.1)
IMM GRANULOCYTES NFR BLD AUTO: 0 %
INR PPP: 1 (ref 0.9–1.2)
IRON SATN MFR SERPL: 34 % (ref 15–55)
IRON SERPL-MCNC: 120 UG/DL (ref 38–169)
LYMPHOCYTES # BLD AUTO: 3.7 X10E3/UL (ref 0.7–3.1)
LYMPHOCYTES NFR BLD AUTO: 49 %
MCH RBC QN AUTO: 30.6 PG (ref 26.6–33)
MCHC RBC AUTO-ENTMCNC: 33.8 G/DL (ref 31.5–35.7)
MCV RBC AUTO: 91 FL (ref 79–97)
MONOCYTES # BLD AUTO: 0.4 X10E3/UL (ref 0.1–0.9)
MONOCYTES NFR BLD AUTO: 6 %
NEUTROPHILS # BLD AUTO: 3.1 X10E3/UL (ref 1.4–7)
NEUTROPHILS NFR BLD AUTO: 42 %
PLATELET # BLD AUTO: 253 X10E3/UL (ref 150–450)
POTASSIUM SERPL-SCNC: 4.4 MMOL/L (ref 3.5–5.2)
PROT SERPL-MCNC: 7.3 G/DL (ref 6–8.5)
PROTHROMBIN TIME: 11 SEC (ref 9.1–12)
RBC # BLD AUTO: 4.77 X10E6/UL (ref 4.14–5.8)
SODIUM SERPL-SCNC: 136 MMOL/L (ref 134–144)
TIBC SERPL-MCNC: 353 UG/DL (ref 250–450)
UIBC SERPL-MCNC: 233 UG/DL (ref 111–343)
WBC # BLD AUTO: 7.4 X10E3/UL (ref 3.4–10.8)

## 2025-02-06 LAB — SMA IGG SER-ACNC: 4 UNITS (ref 0–19)

## 2025-02-07 DIAGNOSIS — Z79.4 TYPE 2 DIABETES MELLITUS WITHOUT COMPLICATION, WITH LONG-TERM CURRENT USE OF INSULIN (HCC): ICD-10-CM

## 2025-02-07 DIAGNOSIS — E11.9 TYPE 2 DIABETES MELLITUS WITHOUT COMPLICATION, WITH LONG-TERM CURRENT USE OF INSULIN (HCC): ICD-10-CM

## 2025-02-08 LAB
ANA SER QL IF: POSITIVE
ANA SPECKLED TITR SER: ABNORMAL {TITER}
LABORATORY COMMENT REPORT: ABNORMAL
PETH BLD QL SCN: POSITIVE
PETH BLD-MCNC: 85 NG/ML

## 2025-02-18 ENCOUNTER — OFFICE VISIT (OUTPATIENT)
Facility: CLINIC | Age: 45
End: 2025-02-18
Payer: COMMERCIAL

## 2025-02-18 VITALS
BODY MASS INDEX: 29.41 KG/M2 | OXYGEN SATURATION: 97 % | TEMPERATURE: 97.5 F | WEIGHT: 183 LBS | RESPIRATION RATE: 16 BRPM | DIASTOLIC BLOOD PRESSURE: 89 MMHG | SYSTOLIC BLOOD PRESSURE: 135 MMHG | HEIGHT: 66 IN | HEART RATE: 100 BPM

## 2025-02-18 DIAGNOSIS — R76.8 ANA POSITIVE: ICD-10-CM

## 2025-02-18 DIAGNOSIS — I10 PRIMARY HYPERTENSION: ICD-10-CM

## 2025-02-18 DIAGNOSIS — Z79.4 TYPE 2 DIABETES MELLITUS WITHOUT COMPLICATION, WITH LONG-TERM CURRENT USE OF INSULIN (HCC): Primary | ICD-10-CM

## 2025-02-18 DIAGNOSIS — F10.10 ALCOHOL ABUSE: ICD-10-CM

## 2025-02-18 DIAGNOSIS — R74.8 ELEVATED LIVER ENZYMES: ICD-10-CM

## 2025-02-18 DIAGNOSIS — I10 ESSENTIAL (PRIMARY) HYPERTENSION: ICD-10-CM

## 2025-02-18 DIAGNOSIS — M25.50 ARTHRALGIA, UNSPECIFIED JOINT: ICD-10-CM

## 2025-02-18 DIAGNOSIS — E11.9 TYPE 2 DIABETES MELLITUS WITHOUT COMPLICATION, WITH LONG-TERM CURRENT USE OF INSULIN (HCC): Primary | ICD-10-CM

## 2025-02-18 DIAGNOSIS — E78.2 MIXED HYPERLIPIDEMIA: ICD-10-CM

## 2025-02-18 PROCEDURE — 3079F DIAST BP 80-89 MM HG: CPT | Performed by: PHYSICIAN ASSISTANT

## 2025-02-18 PROCEDURE — 3075F SYST BP GE 130 - 139MM HG: CPT | Performed by: PHYSICIAN ASSISTANT

## 2025-02-18 PROCEDURE — 99214 OFFICE O/P EST MOD 30 MIN: CPT | Performed by: PHYSICIAN ASSISTANT

## 2025-02-18 RX ORDER — INSULIN GLARGINE 100 [IU]/ML
INJECTION, SOLUTION SUBCUTANEOUS
Qty: 5 ADJUSTABLE DOSE PRE-FILLED PEN SYRINGE | Refills: 0 | Status: SHIPPED | OUTPATIENT
Start: 2025-02-18

## 2025-02-18 RX ORDER — LISINOPRIL 20 MG/1
20 TABLET ORAL DAILY
Qty: 90 TABLET | Refills: 0 | Status: SHIPPED | OUTPATIENT
Start: 2025-02-18

## 2025-02-18 SDOH — ECONOMIC STABILITY: FOOD INSECURITY: WITHIN THE PAST 12 MONTHS, THE FOOD YOU BOUGHT JUST DIDN'T LAST AND YOU DIDN'T HAVE MONEY TO GET MORE.: NEVER TRUE

## 2025-02-18 SDOH — ECONOMIC STABILITY: FOOD INSECURITY: WITHIN THE PAST 12 MONTHS, YOU WORRIED THAT YOUR FOOD WOULD RUN OUT BEFORE YOU GOT MONEY TO BUY MORE.: NEVER TRUE

## 2025-02-18 ASSESSMENT — PATIENT HEALTH QUESTIONNAIRE - PHQ9
1. LITTLE INTEREST OR PLEASURE IN DOING THINGS: NOT AT ALL
2. FEELING DOWN, DEPRESSED OR HOPELESS: NOT AT ALL
SUM OF ALL RESPONSES TO PHQ QUESTIONS 1-9: 0
SUM OF ALL RESPONSES TO PHQ QUESTIONS 1-9: 0
SUM OF ALL RESPONSES TO PHQ9 QUESTIONS 1 & 2: 0
SUM OF ALL RESPONSES TO PHQ QUESTIONS 1-9: 0
SUM OF ALL RESPONSES TO PHQ QUESTIONS 1-9: 0

## 2025-02-18 NOTE — PROGRESS NOTES
1. Have you been to the ER, urgent care clinic since your last visit?  Hospitalized since your last visit?No    2. Have you seen or consulted any other health care providers outside of the [facility] since your last visit?  Include any pap smears or colon screening. No    Chief Complaint   Patient presents with    Medication Refill    Results     Health Maintenance Due   Topic Date Due    Diabetic foot exam  Never done    Diabetic retinal exam  Never done    Hepatitis B vaccine (1 of 3 - 19+ 3-dose series) Never done    DTaP/Tdap/Td vaccine (1 - Tdap) Never done    Pneumococcal 0-49 years Vaccine (1 of 2 - PCV) Never done    Colorectal Cancer Screen  Never done    Flu vaccine (1) Never done    COVID-19 Vaccine (1 - 2024-25 season) Never done    A1C test (Diabetic or Prediabetic)  12/04/2024     PHQ-9 Total Score: 0 (2/18/2025  2:45 PM)        2/18/2025     2:00 PM   AMB Abuse Screening   Do you ever feel afraid of your partner? N   Are you in a relationship with someone who physically or mentally threatens you? N   Is it safe for you to go home? Y     Vitals:    02/18/25 1444   BP: 135/89   Pulse: 100   Resp: 16   Temp: 97.5 °F (36.4 °C)   SpO2: 97%

## 2025-02-18 NOTE — PROGRESS NOTES
History of present illness:Donte Marin is a 44 y.o. male presenting for Medication Refill and Results    Mr. Marin is here for diabetic follow-up.    History of diabetes.  Patient was seen last time September 2024 for diabetes.  His last A1c was 11.1% in 09/2024.  He was started on Lantus for sugar control.  [place]  He continued with current medication after he returned back to country.  Patient follows some diet and exercise.  His fasting blood sugar runs in 260s-280s at home per patient.  He has not done diabetic eye checkup yet.  Patient currently takes Lantus 20 units once a day and metformin for sugar control.    History of hypertension.  Patient takes lisinopril 20 mg for blood pressure control.    History of hyperlipidemia.  Patient used to take Lipitor 20 mg, currently he is not taking Lipitor possibly due to elevated liver enzymes.  Will recheck lab work and restart cholesterol medication if needed.    Patient has history of elevated liver enzymes, alcohol abuse and seems possible autoimmune hepatitis.  Patient has been seen hepatology Dr. Turcios, last visit February 4, 2025.  His recent hepatology lab showed ROMEO positive.  Next appointment in 3 months.  Patient stopped alcohol completely last month per patient.    Patient also mention about multiple joints issues, on and off, for past few weeks.  Discussed with patient about getting rheumatology evaluation for joint related issues.  Patient is okay with that.    Discussed with patient about compliance with medication, treatment, plan and follow-ups.  Patient understands risks and complications of uncontrolled sugar and alcohol abuse.    Patient denies any other concerns today.    Review of Systems   Constitutional:  Negative for chills, fatigue and fever.   HENT:  Negative for congestion, ear pain, rhinorrhea and sore throat.    Eyes:  Negative for visual disturbance.   Respiratory:  Negative for cough and

## 2025-02-19 ASSESSMENT — ENCOUNTER SYMPTOMS
COUGH: 0
VOMITING: 0
RHINORRHEA: 0
SORE THROAT: 0
SHORTNESS OF BREATH: 0
NAUSEA: 0
DIARRHEA: 0

## 2025-02-20 PROBLEM — E11.9 TYPE 2 DIABETES MELLITUS (HCC): Status: ACTIVE | Noted: 2024-08-30

## 2025-02-20 PROBLEM — F10.10 ALCOHOL USE DISORDER, MILD, ABUSE: Status: ACTIVE | Noted: 2022-09-15

## 2025-02-20 PROBLEM — E11.9 TYPE 2 DIABETES MELLITUS WITHOUT COMPLICATION, WITH LONG-TERM CURRENT USE OF INSULIN (HCC): Status: RESOLVED | Noted: 2022-09-15 | Resolved: 2025-02-20

## 2025-02-20 PROBLEM — Z79.4 TYPE 2 DIABETES MELLITUS WITHOUT COMPLICATION, WITH LONG-TERM CURRENT USE OF INSULIN (HCC): Status: RESOLVED | Noted: 2022-09-15 | Resolved: 2025-02-20

## 2025-02-20 PROBLEM — R74.8 ELEVATED SERUM GGT LEVEL: Status: RESOLVED | Noted: 2024-09-27 | Resolved: 2025-02-20

## 2025-02-20 LAB
CHOLEST SERPL-MCNC: 189 MG/DL (ref 100–199)
HBA1C MFR BLD: 13.9 % (ref 4.8–5.6)
HDLC SERPL-MCNC: 44 MG/DL
LDLC SERPL CALC-MCNC: 121 MG/DL (ref 0–99)
TRIGL SERPL-MCNC: 133 MG/DL (ref 0–149)
VLDLC SERPL CALC-MCNC: 24 MG/DL (ref 5–40)

## 2025-02-20 NOTE — RESULT ENCOUNTER NOTE
Recent lab results showed,  Elevated A1c 13.9%. increased from last time.   Take Lantus 25 units at breakfast and 15 units at after dinner.   Keep home sugar monitoring.   Referral to endocrinology is issued.    Keep f/u in 1 month as scheduled.

## 2025-02-27 LAB
14-3-3 ETA AG SER IA-MCNC: <0.2 NG/ML
CCP IGA+IGG SERPL IA-ACNC: <20 UNITS
RHEUMATOID FACT SERPL-ACNC: <14 UNITS/ML

## 2025-05-18 DIAGNOSIS — E11.9 TYPE 2 DIABETES MELLITUS WITHOUT COMPLICATION, WITH LONG-TERM CURRENT USE OF INSULIN (HCC): ICD-10-CM

## 2025-05-18 DIAGNOSIS — Z79.4 TYPE 2 DIABETES MELLITUS WITHOUT COMPLICATION, WITH LONG-TERM CURRENT USE OF INSULIN (HCC): ICD-10-CM

## 2025-05-19 NOTE — TELEPHONE ENCOUNTER
Chief Complaint   Patient presents with    Medication Refill       Requested Prescriptions     Pending Prescriptions Disp Refills    metFORMIN (GLUCOPHAGE) 500 MG tablet [Pharmacy Med Name: METFORMIN  MG TABLET] 180 tablet 0     Sig: TAKE 1 TABLET BY MOUTH TWICE A DAY WITH FOOD       Allergies:  No Known Allergies    Last visit with clinic:  2/18/2025   Next visit with clinic: Visit date not found     Last visit with this provider: 2/18/2025   Next Visit with this provider: Visit date not found    Signed by Raquel Peng MA CMA  05/19/25  8:11 AM

## 2025-05-20 DIAGNOSIS — I10 PRIMARY HYPERTENSION: ICD-10-CM

## 2025-05-20 RX ORDER — LISINOPRIL 20 MG/1
TABLET ORAL
Qty: 90 TABLET | Refills: 0 | Status: SHIPPED | OUTPATIENT
Start: 2025-05-20

## 2025-06-07 DIAGNOSIS — E11.9 TYPE 2 DIABETES MELLITUS WITHOUT COMPLICATION, WITHOUT LONG-TERM CURRENT USE OF INSULIN (HCC): ICD-10-CM

## 2025-06-09 RX ORDER — PEN NEEDLE, DIABETIC 31 GX5/16"
NEEDLE, DISPOSABLE MISCELLANEOUS
Qty: 100 EACH | Refills: 0 | Status: SHIPPED | OUTPATIENT
Start: 2025-06-09

## 2025-06-10 DIAGNOSIS — Z79.4 TYPE 2 DIABETES MELLITUS WITHOUT COMPLICATION, WITH LONG-TERM CURRENT USE OF INSULIN (HCC): ICD-10-CM

## 2025-06-10 DIAGNOSIS — E11.9 TYPE 2 DIABETES MELLITUS WITHOUT COMPLICATION, WITH LONG-TERM CURRENT USE OF INSULIN (HCC): ICD-10-CM

## 2025-06-11 RX ORDER — INSULIN GLARGINE 100 [IU]/ML
INJECTION, SOLUTION SUBCUTANEOUS
Qty: 15 ML | Refills: 0 | Status: SHIPPED | OUTPATIENT
Start: 2025-06-11

## 2025-06-14 DIAGNOSIS — Z79.4 TYPE 2 DIABETES MELLITUS WITHOUT COMPLICATION, WITH LONG-TERM CURRENT USE OF INSULIN (HCC): ICD-10-CM

## 2025-06-14 DIAGNOSIS — E11.9 TYPE 2 DIABETES MELLITUS WITHOUT COMPLICATION, WITH LONG-TERM CURRENT USE OF INSULIN (HCC): ICD-10-CM

## 2025-06-18 DIAGNOSIS — Z79.4 TYPE 2 DIABETES MELLITUS WITHOUT COMPLICATION, WITH LONG-TERM CURRENT USE OF INSULIN (HCC): ICD-10-CM

## 2025-06-18 DIAGNOSIS — E11.9 TYPE 2 DIABETES MELLITUS WITHOUT COMPLICATION, WITH LONG-TERM CURRENT USE OF INSULIN (HCC): ICD-10-CM

## 2025-06-20 ENCOUNTER — OFFICE VISIT (OUTPATIENT)
Facility: CLINIC | Age: 45
End: 2025-06-20
Payer: COMMERCIAL

## 2025-06-20 VITALS
HEIGHT: 66 IN | WEIGHT: 183 LBS | RESPIRATION RATE: 18 BRPM | OXYGEN SATURATION: 96 % | TEMPERATURE: 97.6 F | DIASTOLIC BLOOD PRESSURE: 92 MMHG | SYSTOLIC BLOOD PRESSURE: 146 MMHG | HEART RATE: 113 BPM | BODY MASS INDEX: 29.41 KG/M2

## 2025-06-20 DIAGNOSIS — Z79.4 TYPE 2 DIABETES MELLITUS WITHOUT COMPLICATION, WITH LONG-TERM CURRENT USE OF INSULIN (HCC): ICD-10-CM

## 2025-06-20 DIAGNOSIS — F10.10 ALCOHOL USE DISORDER, MILD, ABUSE: ICD-10-CM

## 2025-06-20 DIAGNOSIS — R00.0 TACHYCARDIA: ICD-10-CM

## 2025-06-20 DIAGNOSIS — I10 PRIMARY HYPERTENSION: ICD-10-CM

## 2025-06-20 DIAGNOSIS — E78.2 MIXED HYPERLIPIDEMIA: ICD-10-CM

## 2025-06-20 DIAGNOSIS — E11.9 TYPE 2 DIABETES MELLITUS WITHOUT COMPLICATION, WITH LONG-TERM CURRENT USE OF INSULIN (HCC): ICD-10-CM

## 2025-06-20 DIAGNOSIS — Z79.4 TYPE 2 DIABETES MELLITUS WITHOUT COMPLICATION, WITH LONG-TERM CURRENT USE OF INSULIN (HCC): Primary | ICD-10-CM

## 2025-06-20 DIAGNOSIS — E11.9 TYPE 2 DIABETES MELLITUS WITHOUT COMPLICATION, WITH LONG-TERM CURRENT USE OF INSULIN (HCC): Primary | ICD-10-CM

## 2025-06-20 PROCEDURE — 99214 OFFICE O/P EST MOD 30 MIN: CPT | Performed by: PHYSICIAN ASSISTANT

## 2025-06-20 PROCEDURE — 3077F SYST BP >= 140 MM HG: CPT | Performed by: PHYSICIAN ASSISTANT

## 2025-06-20 PROCEDURE — 3046F HEMOGLOBIN A1C LEVEL >9.0%: CPT | Performed by: PHYSICIAN ASSISTANT

## 2025-06-20 PROCEDURE — 3080F DIAST BP >= 90 MM HG: CPT | Performed by: PHYSICIAN ASSISTANT

## 2025-06-20 RX ORDER — INSULIN GLARGINE 100 [IU]/ML
INJECTION, SOLUTION SUBCUTANEOUS
Qty: 15 ML | Refills: 0 | Status: SHIPPED | OUTPATIENT
Start: 2025-06-20

## 2025-06-20 RX ORDER — LISINOPRIL 20 MG/1
TABLET ORAL
Qty: 90 TABLET | Refills: 0 | Status: SHIPPED | OUTPATIENT
Start: 2025-06-20

## 2025-06-20 RX ORDER — METOPROLOL SUCCINATE 25 MG/1
25 TABLET, EXTENDED RELEASE ORAL DAILY
Qty: 30 TABLET | Refills: 2 | Status: SHIPPED | OUTPATIENT
Start: 2025-06-20

## 2025-06-20 NOTE — PROGRESS NOTES
Identified pt with two pt identifiers(name and ). Reviewed record in preparation for visit and have obtained necessary documentation. All patient medications has been reviewed.  Chief Complaint   Patient presents with    Lab result    Medication Refill         Health Maintenance Due   Topic    Diabetic foot exam     Diabetic retinal exam     Hepatitis B vaccine (1 of 3 - 19+ 3-dose series)    DTaP/Tdap/Td vaccine (1 - Tdap)    Pneumococcal 0-49 years Vaccine (1 of 2 - PCV)    Colorectal Cancer Screen     COVID-19 Vaccine (2024- season)    A1C test (Diabetic or Prediabetic)      Health Maintenance Review: Patient reminded of \"due or due soon\" health maintenance. I have asked the patient to contact his/her primary care provider (PCP) for follow-up on his/her health maintenance.        Wt Readings from Last 3 Encounters:   25 83 kg (183 lb)   25 83 kg (183 lb)   25 81 kg (178 lb 9.6 oz)     Temp Readings from Last 3 Encounters:   25 97.6 °F (36.4 °C) (Temporal)   25 97.5 °F (36.4 °C) (Skin)   25 97.3 °F (36.3 °C) (Temporal)     BP Readings from Last 3 Encounters:   25 (!) 146/92   25 135/89   25 121/77     Pulse Readings from Last 3 Encounters:   25 (!) 113   25 100   25 95       Vitals:    25 1455   BP: (!) 146/92   Pulse: (!) 113   Resp: 18   Temp: 97.6 °F (36.4 °C)   SpO2: 96%        1. \"Have you been to the ER, urgent care clinic since your last visit?  Hospitalized since your last visit?\" No    2. \"Have you seen or consulted any other health care providers outside of the [facility] since your last visit?\" No     3. For patients aged 45-75: Has the patient had a colonoscopy / FIT/ Cologuard? No

## 2025-06-20 NOTE — PROGRESS NOTES
History of present illness:Donte Marin is a 45 y.o. male presenting for Lab result and Medication Refill    Mr Marin is here for follow-up diabetes 3 months.    History of diabetes.  Patient's last A1c was 13.9%.  He was advised to increase his Lantus last visit, he did not follow-up.  He is currently taking Lantus 25 units in the morning only.  He is also on metformin for sugar control.  His fasting sugar runs in 200s-250s at home per patient.  His last eye checkup was 1-1/2-month ago per patient.  Patient's endocrinology appointment is in October 2025.    History of hypertension.  Patient currently takes lisinopril 20 mg for blood pressure control.  Patient has consistent elevated pulse rate past all visits.  Discussed with patient and will add metoprolol for tachycardia, patient is okay with that.  Today's blood pressure is 146/92 pulse 113.  Patient denies any shortness of breath, chest pain or dizziness.    History of hyperlipidemia.  Parent currently not taking any medication for cholesterol control.    History of alcohol abuse.  Patient's last drink was about a week ago, drank 2 beer.  Patient has been seeing liver specialist Dr. Turcios.    Discussed with patient about compliant with medications, treatment, follow-up and plan.  Patient understand risk and complications of uncontrolled diabetes.    Patient needs medication refills.    Patient denies any other concerns today.    Review of Systems   Constitutional:  Negative for chills, fatigue and fever.   HENT:  Negative for congestion, ear pain, rhinorrhea and sore throat.    Eyes:  Negative for visual disturbance.   Respiratory:  Negative for cough and shortness of breath.    Cardiovascular:  Negative for chest pain and palpitations.   Gastrointestinal:  Negative for diarrhea, nausea and vomiting.   Genitourinary:  Negative for dysuria, flank pain, frequency and urgency.   Musculoskeletal:  Negative for arthralgias and joint swelling.   Skin:  Negative

## 2025-06-23 ASSESSMENT — ENCOUNTER SYMPTOMS
NAUSEA: 0
COUGH: 0
VOMITING: 0
SHORTNESS OF BREATH: 0
SORE THROAT: 0
RHINORRHEA: 0
DIARRHEA: 0

## 2025-07-03 LAB
ALBUMIN SERPL-MCNC: 4.6 G/DL (ref 4.1–5.1)
ALP SERPL-CCNC: 83 IU/L (ref 44–121)
ALT SERPL-CCNC: 30 IU/L (ref 0–44)
AST SERPL-CCNC: 25 IU/L (ref 0–40)
BASOPHILS # BLD AUTO: 0 X10E3/UL (ref 0–0.2)
BASOPHILS NFR BLD AUTO: 1 %
BILIRUB SERPL-MCNC: 0.3 MG/DL (ref 0–1.2)
BUN SERPL-MCNC: 11 MG/DL (ref 6–24)
BUN/CREAT SERPL: 13 (ref 9–20)
CALCIUM SERPL-MCNC: 9.7 MG/DL (ref 8.7–10.2)
CHLORIDE SERPL-SCNC: 101 MMOL/L (ref 96–106)
CHOLEST SERPL-MCNC: 322 MG/DL (ref 100–199)
CO2 SERPL-SCNC: 22 MMOL/L (ref 20–29)
CREAT SERPL-MCNC: 0.88 MG/DL (ref 0.76–1.27)
EGFRCR SERPLBLD CKD-EPI 2021: 107 ML/MIN/1.73
EOSINOPHIL # BLD AUTO: 0.1 X10E3/UL (ref 0–0.4)
EOSINOPHIL NFR BLD AUTO: 2 %
ERYTHROCYTE [DISTWIDTH] IN BLOOD BY AUTOMATED COUNT: 12.7 % (ref 11.6–15.4)
GLOBULIN SER CALC-MCNC: 2.8 G/DL (ref 1.5–4.5)
GLUCOSE SERPL-MCNC: 308 MG/DL (ref 70–99)
HCT VFR BLD AUTO: 46.4 % (ref 37.5–51)
HDLC SERPL-MCNC: 61 MG/DL
HGB BLD-MCNC: 14.9 G/DL (ref 13–17.7)
IMM GRANULOCYTES # BLD AUTO: 0 X10E3/UL (ref 0–0.1)
IMM GRANULOCYTES NFR BLD AUTO: 0 %
LDLC SERPL CALC-MCNC: 216 MG/DL (ref 0–99)
LYMPHOCYTES # BLD AUTO: 2.7 X10E3/UL (ref 0.7–3.1)
LYMPHOCYTES NFR BLD AUTO: 41 %
Lab: ABNORMAL
MCH RBC QN AUTO: 30.4 PG (ref 26.6–33)
MCHC RBC AUTO-ENTMCNC: 32.1 G/DL (ref 31.5–35.7)
MCV RBC AUTO: 95 FL (ref 79–97)
MONOCYTES # BLD AUTO: 0.5 X10E3/UL (ref 0.1–0.9)
MONOCYTES NFR BLD AUTO: 7 %
NEUTROPHILS # BLD AUTO: 3.2 X10E3/UL (ref 1.4–7)
NEUTROPHILS NFR BLD AUTO: 49 %
PLATELET # BLD AUTO: 300 X10E3/UL (ref 150–450)
POTASSIUM SERPL-SCNC: 5.1 MMOL/L (ref 3.5–5.2)
PROT SERPL-MCNC: 7.4 G/DL (ref 6–8.5)
RBC # BLD AUTO: 4.9 X10E6/UL (ref 4.14–5.8)
SODIUM SERPL-SCNC: 139 MMOL/L (ref 134–144)
TRIGL SERPL-MCNC: 229 MG/DL (ref 0–149)
VLDLC SERPL CALC-MCNC: 45 MG/DL (ref 5–40)
WBC # BLD AUTO: 6.5 X10E3/UL (ref 3.4–10.8)

## 2025-07-08 ENCOUNTER — RESULTS FOLLOW-UP (OUTPATIENT)
Facility: CLINIC | Age: 45
End: 2025-07-08

## 2025-07-08 DIAGNOSIS — E78.2 MIXED HYPERLIPIDEMIA: ICD-10-CM

## 2025-07-08 DIAGNOSIS — E11.9 TYPE 2 DIABETES MELLITUS WITHOUT COMPLICATION, WITH LONG-TERM CURRENT USE OF INSULIN (HCC): Primary | ICD-10-CM

## 2025-07-08 DIAGNOSIS — Z79.4 TYPE 2 DIABETES MELLITUS WITHOUT COMPLICATION, WITH LONG-TERM CURRENT USE OF INSULIN (HCC): Primary | ICD-10-CM

## 2025-07-08 LAB
EST. AVERAGE GLUCOSE BLD GHB EST-MCNC: 321 MG/DL
HBA1C MFR BLD: 12.8 %HB

## 2025-07-08 RX ORDER — ATORVASTATIN CALCIUM 10 MG/1
10 TABLET, FILM COATED ORAL DAILY
Qty: 30 TABLET | Refills: 2 | Status: SHIPPED | OUTPATIENT
Start: 2025-07-08

## 2025-07-10 DIAGNOSIS — E78.2 MIXED HYPERLIPIDEMIA: ICD-10-CM

## 2025-07-10 DIAGNOSIS — Z79.4 TYPE 2 DIABETES MELLITUS WITHOUT COMPLICATION, WITH LONG-TERM CURRENT USE OF INSULIN (HCC): ICD-10-CM

## 2025-07-10 DIAGNOSIS — E11.9 TYPE 2 DIABETES MELLITUS WITHOUT COMPLICATION, WITH LONG-TERM CURRENT USE OF INSULIN (HCC): ICD-10-CM

## 2025-07-10 RX ORDER — ATORVASTATIN CALCIUM 10 MG/1
10 TABLET, FILM COATED ORAL DAILY
Qty: 90 TABLET | OUTPATIENT
Start: 2025-07-10

## 2025-08-14 ENCOUNTER — COMMUNITY OUTREACH (OUTPATIENT)
Facility: CLINIC | Age: 45
End: 2025-08-14